# Patient Record
Sex: FEMALE | Race: BLACK OR AFRICAN AMERICAN | Employment: FULL TIME | URBAN - NONMETROPOLITAN AREA
[De-identification: names, ages, dates, MRNs, and addresses within clinical notes are randomized per-mention and may not be internally consistent; named-entity substitution may affect disease eponyms.]

---

## 2022-01-20 ENCOUNTER — APPOINTMENT (OUTPATIENT)
Dept: GENERAL RADIOLOGY | Age: 67
DRG: 389 | End: 2022-01-20
Attending: INTERNAL MEDICINE
Payer: COMMERCIAL

## 2022-01-20 ENCOUNTER — APPOINTMENT (OUTPATIENT)
Dept: CT IMAGING | Age: 67
DRG: 389 | End: 2022-01-20
Attending: INTERNAL MEDICINE
Payer: COMMERCIAL

## 2022-01-20 ENCOUNTER — HOSPITAL ENCOUNTER (INPATIENT)
Age: 67
LOS: 10 days | Discharge: HOME | DRG: 389 | End: 2022-01-30
Attending: INTERNAL MEDICINE | Admitting: INTERNAL MEDICINE
Payer: COMMERCIAL

## 2022-01-20 DIAGNOSIS — R94.31 ABNORMAL EKG: ICD-10-CM

## 2022-01-20 DIAGNOSIS — K56.609 SMALL BOWEL OBSTRUCTION (HCC): Primary | ICD-10-CM

## 2022-01-20 PROBLEM — R00.1 BRADYCARDIA: Status: ACTIVE | Noted: 2022-01-20

## 2022-01-20 LAB
ALBUMIN SERPL-MCNC: 3.9 G/DL (ref 3.5–4.7)
ALBUMIN/GLOB SERPL: 1.1 {RATIO}
ALP SERPL-CCNC: 128 U/L (ref 38–126)
ALT SERPL-CCNC: 15 U/L (ref 3–52)
ANION GAP SERPL CALC-SCNC: 15 MMOL/L
AST SERPL W P-5'-P-CCNC: 16 U/L (ref 14–74)
ATRIAL RATE: 48 BPM
ATRIAL RATE: 50 BPM
BASOPHILS # BLD: 0 K/UL (ref 0–0.1)
BASOPHILS NFR BLD: 0 % (ref 0–2)
BILIRUB DIRECT SERPL-MCNC: 0.1 MG/DL (ref 0–0.3)
BILIRUB SERPL-MCNC: 1 MG/DL (ref 0.2–1)
BUN SERPL-MCNC: 14 MG/DL (ref 9–21)
BUN/CREAT SERPL: 18
CA-I BLD-MCNC: 9.5 MG/DL (ref 8.5–10.5)
CALCULATED P AXIS, ECG09: -30 DEGREES
CALCULATED P AXIS, ECG09: 23 DEGREES
CALCULATED R AXIS, ECG10: -69 DEGREES
CALCULATED R AXIS, ECG10: -73 DEGREES
CALCULATED T AXIS, ECG11: -44 DEGREES
CALCULATED T AXIS, ECG11: -50 DEGREES
CHLORIDE SERPL-SCNC: 97 MMOL/L (ref 94–111)
CO2 SERPL-SCNC: 24 MMOL/L (ref 21–33)
CREAT SERPL-MCNC: 0.8 MG/DL (ref 0.7–1.2)
DIAGNOSIS, 93000: NORMAL
DIAGNOSIS, 93000: NORMAL
DIFFERENTIAL METHOD BLD: ABNORMAL
EOSINOPHIL # BLD: 0 K/UL (ref 0–0.4)
EOSINOPHIL NFR BLD: 0 % (ref 0–5)
ERYTHROCYTE [DISTWIDTH] IN BLOOD BY AUTOMATED COUNT: 14.1 % (ref 11.6–14.5)
GLOBULIN SER CALC-MCNC: 3.7 G/DL
GLUCOSE SERPL-MCNC: 81 MG/DL (ref 70–110)
HCT VFR BLD AUTO: 43 % (ref 35–45)
HGB BLD-MCNC: 14.3 G/DL (ref 12–16)
IMM GRANULOCYTES # BLD AUTO: 0 K/UL (ref 0–0.04)
IMM GRANULOCYTES NFR BLD AUTO: 0 % (ref 0–0.5)
LACTATE SERPL-SCNC: 2.2 MMOL/L (ref 0.5–2)
LIPASE SERPL-CCNC: 16 U/L (ref 10–57)
LYMPHOCYTES # BLD: 1.9 K/UL (ref 0.9–3.6)
LYMPHOCYTES NFR BLD: 24 % (ref 21–52)
MAGNESIUM SERPL-MCNC: 1.8 MG/DL (ref 1.7–2.8)
MCH RBC QN AUTO: 26 PG (ref 24–34)
MCHC RBC AUTO-ENTMCNC: 33.3 G/DL (ref 31–37)
MCV RBC AUTO: 78 FL (ref 78–100)
MONOCYTES # BLD: 0.5 K/UL (ref 0.05–1.2)
MONOCYTES NFR BLD: 7 % (ref 3–10)
NEUTS SEG # BLD: 5.5 K/UL (ref 1.8–8)
NEUTS SEG NFR BLD: 69 % (ref 40–73)
NRBC # BLD: 0 K/UL (ref 0–0.01)
NRBC BLD-RTO: 0 PER 100 WBC
P-R INTERVAL, ECG05: 125 MS
P-R INTERVAL, ECG05: 138 MS
PLATELET # BLD AUTO: 252 K/UL (ref 135–420)
PMV BLD AUTO: 11.2 FL (ref 9.2–11.8)
POTASSIUM SERPL-SCNC: 3 MMOL/L (ref 3.2–5.1)
PROT SERPL-MCNC: 7.6 G/DL (ref 6.1–8.4)
Q-T INTERVAL, ECG07: 490 MS
Q-T INTERVAL, ECG07: 499 MS
QRS DURATION, ECG06: 145 MS
QRS DURATION, ECG06: 148 MS
QTC CALCULATION (BEZET), ECG08: 438 MS
QTC CALCULATION (BEZET), ECG08: 451 MS
RBC # BLD AUTO: 5.51 M/UL (ref 4.2–5.3)
SODIUM SERPL-SCNC: 136 MMOL/L (ref 135–145)
TROPONIN I SERPL-MCNC: 0.02 NG/ML (ref 0.02–0.05)
TROPONIN I SERPL-MCNC: 0.02 NG/ML (ref 0.02–0.05)
VENTRICULAR RATE, ECG03: 48 BPM
VENTRICULAR RATE, ECG03: 49 BPM
WBC # BLD AUTO: 8.1 K/UL (ref 4.6–13.2)

## 2022-01-20 PROCEDURE — 65270000029 HC RM PRIVATE

## 2022-01-20 PROCEDURE — 74011000250 HC RX REV CODE- 250: Performed by: INTERNAL MEDICINE

## 2022-01-20 PROCEDURE — 74011250636 HC RX REV CODE- 250/636: Performed by: NURSE PRACTITIONER

## 2022-01-20 PROCEDURE — 83735 ASSAY OF MAGNESIUM: CPT

## 2022-01-20 PROCEDURE — 80076 HEPATIC FUNCTION PANEL: CPT

## 2022-01-20 PROCEDURE — 84484 ASSAY OF TROPONIN QUANT: CPT

## 2022-01-20 PROCEDURE — 74011000250 HC RX REV CODE- 250: Performed by: PHYSICIAN ASSISTANT

## 2022-01-20 PROCEDURE — 99284 EMERGENCY DEPT VISIT MOD MDM: CPT

## 2022-01-20 PROCEDURE — 71045 X-RAY EXAM CHEST 1 VIEW: CPT

## 2022-01-20 PROCEDURE — 80048 BASIC METABOLIC PNL TOTAL CA: CPT

## 2022-01-20 PROCEDURE — 74176 CT ABD & PELVIS W/O CONTRAST: CPT

## 2022-01-20 PROCEDURE — 74011250636 HC RX REV CODE- 250/636: Performed by: INTERNAL MEDICINE

## 2022-01-20 PROCEDURE — 96374 THER/PROPH/DIAG INJ IV PUSH: CPT

## 2022-01-20 PROCEDURE — 96375 TX/PRO/DX INJ NEW DRUG ADDON: CPT

## 2022-01-20 PROCEDURE — 83605 ASSAY OF LACTIC ACID: CPT

## 2022-01-20 PROCEDURE — 74011250636 HC RX REV CODE- 250/636: Performed by: PHYSICIAN ASSISTANT

## 2022-01-20 PROCEDURE — 99231 SBSQ HOSP IP/OBS SF/LOW 25: CPT | Performed by: SURGERY

## 2022-01-20 PROCEDURE — 83690 ASSAY OF LIPASE: CPT

## 2022-01-20 PROCEDURE — 85025 COMPLETE CBC W/AUTO DIFF WBC: CPT

## 2022-01-20 PROCEDURE — C9113 INJ PANTOPRAZOLE SODIUM, VIA: HCPCS | Performed by: INTERNAL MEDICINE

## 2022-01-20 PROCEDURE — 74019 RADEX ABDOMEN 2 VIEWS: CPT

## 2022-01-20 PROCEDURE — 93005 ELECTROCARDIOGRAM TRACING: CPT

## 2022-01-20 RX ORDER — POTASSIUM CHLORIDE 7.45 MG/ML
10 INJECTION INTRAVENOUS
Status: COMPLETED | OUTPATIENT
Start: 2022-01-20 | End: 2022-01-20

## 2022-01-20 RX ORDER — ATORVASTATIN CALCIUM 40 MG/1
40 TABLET, FILM COATED ORAL
COMMUNITY

## 2022-01-20 RX ORDER — POTASSIUM CHLORIDE 7.45 MG/ML
10 INJECTION INTRAVENOUS
Status: DISCONTINUED | OUTPATIENT
Start: 2022-01-20 | End: 2022-01-20

## 2022-01-20 RX ORDER — LANOLIN ALCOHOL/MO/W.PET/CERES
400 CREAM (GRAM) TOPICAL DAILY
COMMUNITY

## 2022-01-20 RX ORDER — CARVEDILOL 6.25 MG/1
6.25 TABLET ORAL 2 TIMES DAILY WITH MEALS
COMMUNITY
Start: 2021-10-19

## 2022-01-20 RX ORDER — MAGNESIUM SULFATE HEPTAHYDRATE 40 MG/ML
2 INJECTION, SOLUTION INTRAVENOUS ONCE
Status: COMPLETED | OUTPATIENT
Start: 2022-01-20 | End: 2022-01-20

## 2022-01-20 RX ORDER — GUAIFENESIN 100 MG/5ML
LIQUID (ML) ORAL
COMMUNITY

## 2022-01-20 RX ORDER — ONDANSETRON 2 MG/ML
4 INJECTION INTRAMUSCULAR; INTRAVENOUS
Status: COMPLETED | OUTPATIENT
Start: 2022-01-20 | End: 2022-01-20

## 2022-01-20 RX ORDER — LORAZEPAM 2 MG/ML
1 INJECTION INTRAMUSCULAR
Status: DISCONTINUED | OUTPATIENT
Start: 2022-01-20 | End: 2022-01-30 | Stop reason: HOSPADM

## 2022-01-20 RX ORDER — NITROGLYCERIN 0.4 MG/1
TABLET SUBLINGUAL
COMMUNITY
Start: 2021-10-20

## 2022-01-20 RX ORDER — LYSINE HCL 500 MG
1 TABLET ORAL DAILY
COMMUNITY

## 2022-01-20 RX ORDER — DEXTROSE, SODIUM CHLORIDE, AND POTASSIUM CHLORIDE 5; .45; .15 G/100ML; G/100ML; G/100ML
100 INJECTION INTRAVENOUS CONTINUOUS
Status: DISCONTINUED | OUTPATIENT
Start: 2022-01-20 | End: 2022-01-30 | Stop reason: HOSPADM

## 2022-01-20 RX ADMIN — FAMOTIDINE 20 MG: 10 INJECTION, SOLUTION INTRAVENOUS at 22:14

## 2022-01-20 RX ADMIN — MAGNESIUM SULFATE HEPTAHYDRATE 2 G: 40 INJECTION, SOLUTION INTRAVENOUS at 14:00

## 2022-01-20 RX ADMIN — POTASSIUM CHLORIDE 10 MEQ: 7.46 INJECTION, SOLUTION INTRAVENOUS at 18:14

## 2022-01-20 RX ADMIN — SODIUM CHLORIDE 80 MG: 9 INJECTION INTRAMUSCULAR; INTRAVENOUS; SUBCUTANEOUS at 10:18

## 2022-01-20 RX ADMIN — LORAZEPAM 1 MG: 2 INJECTION INTRAMUSCULAR; INTRAVENOUS at 22:14

## 2022-01-20 RX ADMIN — POTASSIUM CHLORIDE 10 MEQ: 7.46 INJECTION, SOLUTION INTRAVENOUS at 18:48

## 2022-01-20 RX ADMIN — POTASSIUM CHLORIDE 10 MEQ: 7.46 INJECTION, SOLUTION INTRAVENOUS at 19:58

## 2022-01-20 RX ADMIN — POTASSIUM CHLORIDE 10 MEQ: 7.46 INJECTION, SOLUTION INTRAVENOUS at 17:20

## 2022-01-20 RX ADMIN — POTASSIUM CHLORIDE, DEXTROSE MONOHYDRATE AND SODIUM CHLORIDE 100 ML/HR: 150; 5; 450 INJECTION, SOLUTION INTRAVENOUS at 12:54

## 2022-01-20 RX ADMIN — ONDANSETRON 4 MG: 2 INJECTION INTRAMUSCULAR; INTRAVENOUS at 10:16

## 2022-01-20 NOTE — PROGRESS NOTES
1330- assumed care of patient to room 247. NG  Intact and on continuous suction per surgery. No bowel sounds noted to lower quadrants. Pt reports being uncomfortable. Oriented to room and cb system. CBWR    1500- in on rounds, no needs expressed    1730- pt up to [facility] and NG tube came out completely. 1810- new NG inserted into right nare, pt reports being in pain and nauseas. PA aware- he contacted surgery who ordered to turn ng to int medium suction- complete.  Ativan will be ordered

## 2022-01-20 NOTE — PROGRESS NOTES
Problem: Falls - Risk of  Goal: *Absence of Falls  Description: Document Loida Skill Fall Risk and appropriate interventions in the flowsheet.   Outcome: Progressing Towards Goal  Note: Fall Risk Interventions:                                Problem: Patient Education: Go to Patient Education Activity  Goal: Patient/Family Education  Outcome: Progressing Towards Goal

## 2022-01-20 NOTE — ROUTINE PROCESS
TRANSFER - OUT REPORT:    Verbal report given to Jacqueline Islas RN(name) on Fadia Vaughn  being transferred to [facility] (unit) for routine progression of care       Report consisted of patients Situation, Background, Assessment and   Recommendations(SBAR). Information from the following report(s) SBAR, MAR, Recent Results and Cardiac Rhythm Sinus Lesli Villar was reviewed with the receiving nurse. Lines:   Peripheral IV 01/20/22 Right Antecubital (Active)   Site Assessment Clean, dry, & intact 01/20/22 0901   Phlebitis Assessment 0 01/20/22 0901   Infiltration Assessment 0 01/20/22 0901   Dressing Status Clean, dry, & intact 01/20/22 0901   Hub Color/Line Status Green 01/20/22 0901        Opportunity for questions and clarification was provided.       Patient transported with:   Monitor  Registered Nurse

## 2022-01-20 NOTE — CONSULTS
Surgery consult      CC:  Nausea and vomiting. HPI:  The patient is a 72 y/o female with a 4 day history of nausea and vomiting. She has taken nothing other than water by mouth during that time. She states that when she drinks water, she immediately vomits it back up. Denies any hematemesis. She denies passage of flatus or stool during that time also. She presents to the E.R. for evaluation. History and CT scan done there suggest small bowel obstruction and surgery consultation obtained. Past Medical and surgcial history:    History of CAD. M.I. X2 with multiple cardiac stents. Denies any history of CHF  S/P vaginal histerectomy. Hx of knee surgery. Denies any history of PUD, Gall bladder disease, diverticular diseasefor cancer . She had a colonoscopy 2 years ago and she reports a history of benign colon polyps. Social history:    Currently a smoker and uses smokeless tobacco.    Medication allergies:  PCN    Medications reviewed    Family history; Noncontributory    Review of systems:  Negative except for what was described in the H.P.I. She denies any chest pain, orthopnea or D.O.E. Physical exam: VS noted. 72 y/o female in no acute distress. Does not appear acutely ill. Cooperative to interview and exam.  HEENT:--oral mucosa is dry    Neck:--Supple without masses or thyroid enlargement    Chest:  Lungs clear. Heart:  Regular in rhythm and rate  Abdomen is soft, not distended, small reducible umbilical hernia. No abdominal incisions noted, no masses. B.S. are normoactive. No groin hernias. Rectal-scant brown stool weakly guiac positive. Pelvic: deferred  Ext.;  No C/C/E. Neurologic grossly intact. Lab:  WBC 8000  K+ 3.0    CT films and report reviewed:  Mild dilitation of proximal small bowel. No free fluid. No free air. Question of closed loop raised because of distal decompression andproximal distention    Impression: 1. At least a partial S.B.O.   Patient in no distress and abdominal exam is remarkably benign. 2. Hypokalemia--may be contributing to # 1                       3. C. A.D by history  Plan:    Due to patient's benign exam, I fel a trial of non operative therapy with NG decompression, replacement of electrolytes and serial X rays and exams are warranted. Have discussed with E.R. and admitting physicians.

## 2022-01-20 NOTE — H&P
Hospitalist Admission Note    NAME: Marty Millard   :  1955   MRN:  [id number]     Date/Time:  2022 12:14 PM    Attending: Edmund Wright MD  Patient PCP: None  ______________________________________________________________________  Given the patient's current clinical presentation, I have a high level of concern for decompensation if discharged from the emergency department. Complex decision making was performed, which includes reviewing the patient's available past medical records, lab, and x-rays    Assessment / Plan:  Patient Active Problem List    Diagnosis Date Noted    Small bowel obstruction ([address] 2022    Abnormal EKG 2022        Abnormal EKG  -Rhythm: sinus bradycardia; and regular . Rate (approx.): 48; Axis: left axis deviation; NM interval: normal; QRS interval: prolonged; ST/T wave: T wave inverted; Other findings: QTc 438. Deeply inverted T waves in II; III; aVF. No ST elevation noted in I or aVL. .  -Evaluated by cardiology  -Cardiology consultation deferred  -We will maintain potassium and magnesium at 4 and 2 respectively    Bradycardia  -Evaluated by cardiology  -Pulse ranging between 48 and 59  -Blood pressure mildly elevated on admission  - Telemetry  -Monitor      Small bowel obstruction (HCC)  - Evaluated by general surgery  -NG to suction and n.p.o.  -Repeat abdominal study in the morning  -Will be signed out to oncoming general surgery for tomorrow  -Requiring no pain medications at this time        Code Status: Full code      DVT Prophylaxis: Lovenox SubQ    GI Prophylaxis: Pepcid twice daily      Subjective:   CHIEF COMPLAINT: Midepigastric pain    HISTORY OF PRESENT ILLNESS:     Rossy Kelly is a 73 y.o. female who presents with past medical history for MI, hyperlipidemia, hypertension who presented to the emergency department with epigastric abdominal pain associate with nausea and vomiting for 5 days.   She initially attributed it to seafood she ate on Sunday and then the discomfort persisted burning in nature radiating to her back. She subsequently had related shortness of breath and diaphoresis. She was evaluated in the emergency department and found to have a partial small bowel obstruction who was evaluated by the surgeon with it characterized as mild or even benign. Was also found to have a low potassium she feels could have been contributing to the partial SBO. Patient has been admitted for evaluation and treatment of the problems noted in the plan above. Past Medical History:   Diagnosis Date    Heart attack ([address]     x 2, stents placed x 5        Past Surgical History:   Procedure Laterality Date    HX HYSTERECTOMY      HX ORTHOPAEDIC      right knee       Social History     Tobacco Use    Smoking status: Current Every Day Smoker    Smokeless tobacco: Current User   Substance Use Topics    Alcohol use: Not on file     Comment: socially        No family history on file. Allergies   Allergen Reactions    Penicillins Hives        Prior to Admission medications    Medication Sig Start Date End Date Taking? Authorizing Provider   carvediloL (COREG) 6.25 mg tablet 1 tablet with food 10/19/21  Yes Yolanda St MD   atorvastatin (LIPITOR) 40 mg tablet atorvastatin 40 mg tablet    Yolanda St MD   aspirin (Wendy Chewable Aspirin) 81 mg chewable tablet aspirin 81 mg tablet   Take 1 tablet every day by oral route. Yolanda St MD   nitroglycerin (NITROSTAT) 0.4 mg SL tablet place 1 tablet under the tongue if needed every 5 minutes for jonnie...  (REFER TO PRESCRIPTION NOTES). 10/20/21   Yolanda St MD   folic acid 738 mcg tablet Take 400 mcg by mouth daily. Yolanda St MD   Calcium Carbonate-Vit D3-Min 600 mg calcium- 400 unit tab Take 1 Tablet by mouth daily.     Yolanda St MD       REVIEW OF SYSTEMS:   Total of 12 systems reviewed as follows:         POSITIVE= underlined text  Negative = text not underlined  General:  fever, chills, sweats, generalized weakness, weight loss/gain,      loss of appetite   Eyes:    blurred vision, eye pain, loss of vision, double vision  ENT:    rhinorrhea, pharyngitis   Respiratory:   cough, sputum production, SOB, FONTENOT, wheezing, pleuritic pain   Cardiology:   chest pain, palpitations, orthopnea, PND, edema, syncope   Gastrointestinal:  abdominal pain , N/V, diarrhea, dysphagia, constipation, bleeding   Genitourinary:  frequency, urgency, dysuria, hematuria, incontinence   Muskuloskeletal :  arthralgia, myalgia, back pain  Hematology:  easy bruising, nose or gum bleeding, lymphadenopathy   Dermatological: rash, ulceration, pruritis, color change / jaundice  Endocrine:   hot flashes or polydipsia   Neurological:  headache, dizziness, confusion, focal weakness, paresthesia,     Speech difficulties, memory loss, gait difficulty  Psychological: Feelings of anxiety, depression, agitation    Objective:   VITALS:    Visit Vitals  BP (!) 166/83 (BP 1 Location: Left upper arm, BP Patient Position: Semi fowlers)   Pulse (!) 51   Temp 97.6 °F (36.4 °C)   Resp 18   Ht 5' 2\" (1.575 m)   Wt 71.7 kg (158 lb)   SpO2 100%   BMI 28.90 kg/m²       PHYSICAL EXAM:    General:    Alert, cooperative, no distress, appears stated age. HEENT: Atraumatic, anicteric sclerae, pink conjunctivae     No oral ulcers, mucosa moist, throat clear, dentition fair  Neck:  Supple, symmetrical,  thyroid: non tender  Lungs:   Clear to auscultation bilaterally. No Wheezing or Rhonchi. No rales. Chest wall:  No tenderness  No Accessory muscle use. Heart:   Regular  rhythm,  No  murmur   No edema  Abdomen:   Soft, non-tender. Not distended. Bowel sounds normal  Extremities: No cyanosis. No clubbing,      Skin turgor normal, Capillary refill normal, Radial dial pulse 2+  Skin:     Not pale. Not Jaundiced  No rashes   Psych:  Good insight. Not depressed. Not anxious or agitated. Neurologic: EOMs intact.  No facial asymmetry. No aphasia or slurred speech. Symmetrical strength, Sensation grossly intact. Alert and oriented X 4.     _______________________________________________________________________  Care Plan discussed with:    Comments   Patient X    Family      RN X    Care Manager                    Consultant:      _______________________________________________________________________  Expected  Disposition:   Home with Family X   HH/PT/OT/RN    SNF/LTC    MAXIMUS    ________________________________________________________________________  TOTAL TIME:  48  Minutes    Critical Care Provided     Minutes non procedure based      Comments    X Reviewed previous records   >50% of visit spent in counseling and coordination of care X Discussion with patient and/or family and questions answered       ________________________________________________________________________  Signed: Aurelio Degroot, PhD, PA-C, [facility]    Procedures: see electronic medical records for all procedures/Xrays and details which were not copied into this note but were reviewed prior to creation of Plan.     LAB DATA REVIEWED:    Recent Results (from the past 24 hour(s))   EKG, 12 LEAD, INITIAL    Collection Time: 01/20/22  8:50 AM   Result Value Ref Range    Ventricular Rate 48 BPM    Atrial Rate 48 BPM    P-R Interval 125 ms    QRS Duration 148 ms    Q-T Interval 490 ms    QTC Calculation (Bezet) 438 ms    Calculated P Axis -30 degrees    Calculated R Axis -73 degrees    Calculated T Axis -44 degrees    Diagnosis       Sinus bradycardia  RBBB and LAFB  Probable left ventricular hypertrophy  Abnormal T, consider ischemia, inferior leads     CBC WITH AUTOMATED DIFF    Collection Time: 01/20/22  8:55 AM   Result Value Ref Range    WBC 8.1 4.6 - 13.2 K/uL    RBC 5.51 (H) 4.20 - 5.30 M/uL    HGB 14.3 12.0 - 16.0 g/dL    HCT 43.0 35.0 - 45.0 %    MCV 78.0 78.0 - 100.0 FL    MCH 26.0 24.0 - 34.0 PG    MCHC 33.3 31.0 - 37.0 g/dL    RDW 14.1 11.6 - 14.5 %    PLATELET 222 290 - 653 K/uL    MPV 11.2 9.2 - 11.8 FL    NRBC 0.0 0.0  WBC    ABSOLUTE NRBC 0.00 0.00 - 0.01 K/uL    NEUTROPHILS 69 40 - 73 %    LYMPHOCYTES 24 21 - 52 %    MONOCYTES 7 3 - 10 %    EOSINOPHILS 0 0 - 5 %    BASOPHILS 0 0 - 2 %    IMMATURE GRANULOCYTES 0 0 - 0.5 %    ABS. NEUTROPHILS 5.5 1.8 - 8.0 K/UL    ABS. LYMPHOCYTES 1.9 0.9 - 3.6 K/UL    ABS. MONOCYTES 0.5 0.05 - 1.2 K/UL    ABS. EOSINOPHILS 0.0 0.0 - 0.4 K/UL    ABS. BASOPHILS 0.0 0.0 - 0.1 K/UL    ABS. IMM. GRANS. 0.0 0.00 - 0.04 K/UL    DF AUTOMATED     METABOLIC PANEL, BASIC    Collection Time: 01/20/22  8:55 AM   Result Value Ref Range    Sodium 136 135 - 145 mmol/L    Potassium 3.0 (L) 3.2 - 5.1 mmol/L    Chloride 97 94 - 111 mmol/L    CO2 24 21 - 33 mmol/L    Anion gap 15 mmol/L    Glucose 81 70 - 110 mg/dL    BUN 14 9 - 21 mg/dL    Creatinine 0.80 0.70 - 1.20 mg/dL    BUN/Creatinine ratio 18      GFR est AA >60 ml/min/1.73m2    GFR est non-AA >60 ml/min/1.73m2    Calcium 9.5 8.5 - 10.5 mg/dL   TROPONIN I    Collection Time: 01/20/22  8:55 AM   Result Value Ref Range    Troponin-I, Qt. 0.02 0.02 - 0.05 ng/mL   LIPASE    Collection Time: 01/20/22  8:55 AM   Result Value Ref Range    Lipase 16 10 - 57 U/L   LACTIC ACID    Collection Time: 01/20/22  8:55 AM   Result Value Ref Range    Lactic acid 2.2 (HH) 0.5 - 2.0 mmol/L   MAGNESIUM    Collection Time: 01/20/22  8:55 AM   Result Value Ref Range    Magnesium 1.8 1.7 - 2.8 mg/dL   HEPATIC FUNCTION PANEL    Collection Time: 01/20/22  8:55 AM   Result Value Ref Range    Protein, total 7.6 6.1 - 8.4 g/dL    Albumin 3.9 3.5 - 4.7 g/dL    Globulin 3.7 g/dL    A-G Ratio 1.1      Bilirubin, total 1.0 0.2 - 1.0 mg/dL    Bilirubin, direct 0.1 0.0 - 0.3 mg/dL    Alk.  phosphatase 128 (H) 38 - 126 U/L    AST (SGOT) 16 14 - 74 U/L    ALT (SGPT) 15 3 - 52 U/L   EKG, 12 LEAD, INITIAL    Collection Time: 01/20/22 10:23 AM   Result Value Ref Range    Ventricular Rate 49 BPM    Atrial Rate 50 BPM    P-R Interval 138 ms    QRS Duration 145 ms    Q-T Interval 499 ms    QTC Calculation (Bezet) 451 ms    Calculated P Axis 23 degrees    Calculated R Axis -69 degrees    Calculated T Axis -50 degrees    Diagnosis       Sinus bradycardia  RBBB and LAFB  Probable left ventricular hypertrophy

## 2022-01-20 NOTE — ASSESSMENT & PLAN NOTE
-Evaluated by cardiology  -Pulse ranging between 48 and 59  -Blood pressure mildly elevated on admission  - Telemetry  -Monitor

## 2022-01-20 NOTE — ED TRIAGE NOTES
Patient recently dx with food poison this week, c/o nausea and vomited x 3, sharp abdominal pains. Took nausea this morning. Denies diarrhea. fever at home Sunday and Monday 102.  Has had covid vaccines

## 2022-01-20 NOTE — ASSESSMENT & PLAN NOTE
- Evaluated by general surgery  -NG to suction and n.p.o.  -Repeat abdominal study in the morning  -Will be signed out to oncoming general surgery for tomorrow  -Requiring no pain medications at this time

## 2022-01-20 NOTE — ED PROVIDER NOTES
EMERGENCY DEPARTMENT HISTORY AND PHYSICAL EXAM      Date: 1/20/2022  Patient Name: Dwayne Rawls      History of Presenting Illness     Chief Complaint   Patient presents with    Vomiting       History Provided By: Patient    HPI: Dwayne Rawls, 73 y.o. female with a past medical history significant for MI x 2; 3 stents placed in [facility] in '04 and one in [facility] in 6/20; Dr Yuan Pollock; HTN; HLD that presents to the ED with cc of epigastric abd pain; n/v for past 5 days. Pt states that she ate some seafood on Sunday and later developed n/v and burning epigastric discomfort that radiates straight back and is associated with waves of n/v. Pain worse with water. No diarrhea. No sob or diaphoresis. There are no other complaints, changes, or physical findings at this time. PCP: None    Current Outpatient Medications   Medication Sig Dispense Refill    carvediloL (COREG) 6.25 mg tablet 1 tablet with food      atorvastatin (LIPITOR) 40 mg tablet atorvastatin 40 mg tablet      aspirin (Wendy Chewable Aspirin) 81 mg chewable tablet aspirin 81 mg tablet   Take 1 tablet every day by oral route.  nitroglycerin (NITROSTAT) 0.4 mg SL tablet place 1 tablet under the tongue if needed every 5 minutes for jonnie...  (REFER TO PRESCRIPTION NOTES).  folic acid 869 mcg tablet Take 400 mcg by mouth daily.  Calcium Carbonate-Vit D3-Min 600 mg calcium- 400 unit tab Take 1 Tablet by mouth daily. Past History     Past Medical History:  Past Medical History:   Diagnosis Date    Heart attack ([address]     x 2, stents placed x 5       Past Surgical History:  Past Surgical History:   Procedure Laterality Date    HX HYSTERECTOMY      HX ORTHOPAEDIC      right knee       Family History:  No family history on file.     Social History:  Social History     Tobacco Use    Smoking status: Current Every Day Smoker    Smokeless tobacco: Current User   Vaping Use    Vaping Use: Not on file   Substance Use Topics    Alcohol use: Not on file     Comment: socially    Drug use: Not Currently       Allergies: Allergies   Allergen Reactions    Penicillins Hives         Review of Systems     Review of Systems   Constitutional: Negative for chills and fever. HENT: Negative for trouble swallowing. Eyes: Negative for visual disturbance. Respiratory: Negative for cough, chest tightness and shortness of breath. Cardiovascular: Negative for chest pain, palpitations and leg swelling. Gastrointestinal: Positive for abdominal pain, nausea and vomiting. Negative for constipation and diarrhea. Genitourinary: Negative for dysuria and flank pain. Musculoskeletal: Negative for back pain. Skin: Negative for rash. Neurological: Negative for headaches. Psychiatric/Behavioral: Negative for confusion. Physical Exam     Physical Exam  Vitals and nursing note reviewed. Constitutional:       General: She is not in acute distress. Appearance: She is well-developed. She is ill-appearing. HENT:      Head: Normocephalic. Mouth/Throat:      Pharynx: No oropharyngeal exudate. Eyes:      General:         Right eye: No discharge. Pupils: Pupils are equal, round, and reactive to light. Neck:      Thyroid: No thyromegaly. Vascular: No JVD. Cardiovascular:      Rate and Rhythm: Normal rate and regular rhythm. Heart sounds: No murmur heard. No friction rub. No gallop. Pulmonary:      Effort: Pulmonary effort is normal. No respiratory distress. Breath sounds: Normal breath sounds. No wheezing or rales. Chest:      Chest wall: No tenderness. Abdominal:      General: Bowel sounds are normal. There is no distension. Palpations: Abdomen is soft. Tenderness: There is abdominal tenderness. There is no guarding or rebound.       Comments: Mild epigastric pain w/o guarding or rebound   Genitourinary:     Comments: Brown; trace hemoccult positive stool  Musculoskeletal:         General: Normal range of motion. Cervical back: Normal range of motion. Skin:     General: Skin is warm. Findings: No erythema. Neurological:      Mental Status: She is alert and oriented to person, place, and time. Psychiatric:         Behavior: Behavior normal.         Lab and Diagnostic Study Results     Labs -     Recent Results (from the past 12 hour(s))   EKG, 12 LEAD, INITIAL    Collection Time: 01/20/22  8:50 AM   Result Value Ref Range    Ventricular Rate 48 BPM    Atrial Rate 48 BPM    P-R Interval 125 ms    QRS Duration 148 ms    Q-T Interval 490 ms    QTC Calculation (Bezet) 438 ms    Calculated P Axis -30 degrees    Calculated R Axis -73 degrees    Calculated T Axis -44 degrees    Diagnosis       Sinus bradycardia  RBBB and LAFB  Probable left ventricular hypertrophy  Abnormal T, consider ischemia, inferior leads     CBC WITH AUTOMATED DIFF    Collection Time: 01/20/22  8:55 AM   Result Value Ref Range    WBC 8.1 4.6 - 13.2 K/uL    RBC 5.51 (H) 4.20 - 5.30 M/uL    HGB 14.3 12.0 - 16.0 g/dL    HCT 43.0 35.0 - 45.0 %    MCV 78.0 78.0 - 100.0 FL    MCH 26.0 24.0 - 34.0 PG    MCHC 33.3 31.0 - 37.0 g/dL    RDW 14.1 11.6 - 14.5 %    PLATELET 786 689 - 176 K/uL    MPV 11.2 9.2 - 11.8 FL    NRBC 0.0 0.0  WBC    ABSOLUTE NRBC 0.00 0.00 - 0.01 K/uL    NEUTROPHILS 69 40 - 73 %    LYMPHOCYTES 24 21 - 52 %    MONOCYTES 7 3 - 10 %    EOSINOPHILS 0 0 - 5 %    BASOPHILS 0 0 - 2 %    IMMATURE GRANULOCYTES 0 0 - 0.5 %    ABS. NEUTROPHILS 5.5 1.8 - 8.0 K/UL    ABS. LYMPHOCYTES 1.9 0.9 - 3.6 K/UL    ABS. MONOCYTES 0.5 0.05 - 1.2 K/UL    ABS. EOSINOPHILS 0.0 0.0 - 0.4 K/UL    ABS. BASOPHILS 0.0 0.0 - 0.1 K/UL    ABS. IMM.  GRANS. 0.0 0.00 - 0.04 K/UL    DF AUTOMATED     METABOLIC PANEL, BASIC    Collection Time: 01/20/22  8:55 AM   Result Value Ref Range    Sodium 136 135 - 145 mmol/L    Potassium 3.0 (L) 3.2 - 5.1 mmol/L    Chloride 97 94 - 111 mmol/L    CO2 24 21 - 33 mmol/L    Anion gap 15 mmol/L    Glucose 81 70 - 110 mg/dL    BUN 14 9 - 21 mg/dL    Creatinine 0.80 0.70 - 1.20 mg/dL    BUN/Creatinine ratio 18      GFR est AA >60 ml/min/1.73m2    GFR est non-AA >60 ml/min/1.73m2    Calcium 9.5 8.5 - 10.5 mg/dL   TROPONIN I    Collection Time: 01/20/22  8:55 AM   Result Value Ref Range    Troponin-I, Qt. 0.02 0.02 - 0.05 ng/mL   LIPASE    Collection Time: 01/20/22  8:55 AM   Result Value Ref Range    Lipase 16 10 - 57 U/L   LACTIC ACID    Collection Time: 01/20/22  8:55 AM   Result Value Ref Range    Lactic acid 2.2 (HH) 0.5 - 2.0 mmol/L   MAGNESIUM    Collection Time: 01/20/22  8:55 AM   Result Value Ref Range    Magnesium 1.8 1.7 - 2.8 mg/dL   HEPATIC FUNCTION PANEL    Collection Time: 01/20/22  8:55 AM   Result Value Ref Range    Protein, total 7.6 6.1 - 8.4 g/dL    Albumin 3.9 3.5 - 4.7 g/dL    Globulin 3.7 g/dL    A-G Ratio 1.1      Bilirubin, total 1.0 0.2 - 1.0 mg/dL    Bilirubin, direct 0.1 0.0 - 0.3 mg/dL    Alk. phosphatase 128 (H) 38 - 126 U/L    AST (SGOT) 16 14 - 74 U/L    ALT (SGPT) 15 3 - 52 U/L   EKG, 12 LEAD, INITIAL    Collection Time: 01/20/22 10:23 AM   Result Value Ref Range    Ventricular Rate 49 BPM    Atrial Rate 50 BPM    P-R Interval 138 ms    QRS Duration 145 ms    Q-T Interval 499 ms    QTC Calculation (Bezet) 451 ms    Calculated P Axis 23 degrees    Calculated R Axis -69 degrees    Calculated T Axis -50 degrees    Diagnosis       Sinus bradycardia  RBBB and LAFB  Probable left ventricular hypertrophy         Radiologic Studies -   [unfilled]  CT Results  (Last 48 hours)               01/20/22 1002  CT ABD PELV WO CONT Final result    Impression:      1. Distal mild small bowel obstruction. Transition appears to be within the   anterior abdomen, likely from adhesions. Proximal small bowel also is   decompressed, raising the possibility of closed loop obstruction. 2. Cholelithiasis. 3. Widemouth fat containing umbilical hernia.        Narrative:  EXAM: CT of the abdomen and pelvis CLINICAL INDICATION/HISTORY: epigastric pain and vomiting     > Additional: None. COMPARISON: CT abdomen pelvis with contrast 02/06/2017     > Reference Exam: None. TECHNIQUE: Axial CT imaging of the abdomen and pelvis was performed without   intravenous contrast. Oral contrast not administered. Multiplanar reformats   were generated. Dose reduction techniques used: automated exposure control,   adjustment of the mAs and/or kVp according to patient size, and iterative   reconstruction techniques. Digital Imaging and Communications in Medicine   (DICOM) format image data are available to nonaffiliated external healthcare   facilities or entities on a secure, media free, reciprocally searchable basis   with patient authorization for at least a 12-month period after this study. _______________       FINDINGS:       LOWER CHEST: Coronary atherosclerosis. Lung bases are clear. LIVER, BILIARY: Liver is unremarkable. No biliary dilation. Dependent gallstone   without gallbladder distention or adjacent inflammatory changes. PANCREAS: Unremarkable. SPLEEN: Unremarkable. ADRENALS: Unremarkable. KIDNEYS/URETERS/BLADDER: No hydronephrosis. No calculi. Upper pole low density   lesion measuring 8 mm, very likely benign such as cyst. No follow-up imaging is   recommended as incidental lesions are likely benign. LYMPH NODES: No enlarged lymph nodes. GASTROINTESTINAL TRACT: Long segment of mild small bowel dilatation (up to 3.5   cm diameter) extending from the mid small bowel to the distal small bowel. There   is decompressed proximal and distal small bowel. Distal transition appears to be   within the anterior abdomen (axial image 86; sagittal 54), with no mass, likely   secondary to adhesions. No colonic dilatation. Colonic diverticulosis. PELVIC ORGANS: Hysterectomy. VASCULATURE: Mild to moderate aortoiliac atherosclerosis.        BONES: No acute or aggressive osseous abnormalities identified. Moderate lower   lumbar degenerative disc disease. OTHER: No ascites. Widemouth fat containing umbilical hernia with neck measuring   20 mm.       _______________               CXR Results  (Last 48 hours)               01/20/22 0937  XR CHEST PORT Final result    Impression:      No active cardiopulmonary disease. Narrative:  EXAM: CHEST RADIOGRAPH, SINGLE VIEW       CLINICAL INDICATION/HISTORY: Fever, chest pain, vomiting       COMPARISON: 6/15/2020       TECHNIQUE: Portable frontal view of the chest was obtained.        _______________       FINDINGS:       SUPPORT DEVICES: None. HEART AND MEDIASTINUM: Cardiomediastinal silhouette appears within normal   limits. Tortuous and atherosclerotic thoracic aorta. No central vascular   congestion. LUNGS AND PLEURAL SPACES: Lungs are well aerated with no confluent airspace   opacity. No pleural effusion or pneumothorax. BONY THORAX AND SOFT TISSUES: No acute osseous abnormality. _______________                 Medical Decision Making and ED Course   - I am the first and primary provider for this patient AND AM THE PRIMARY PROVIDER OF RECORD. - I reviewed the vital signs, available nursing notes, past medical history, past surgical history, family history and social history. - Initial assessment performed. The patients presenting problems have been discussed, and the staff are in agreement with the care plan formulated and outlined with them. I have encouraged them to ask questions as they arise throughout their visit. Vital Signs-Reviewed the patient's vital signs. Patient Vitals for the past 12 hrs:   Temp Pulse Resp BP SpO2   01/20/22 0901 -- -- -- -- 100 %   01/20/22 0856 97.6 °F (36.4 °C) (!) 51 18 (!) 166/83 100 %       EKG interpretation: (Preliminary): Performed at 0850  Rhythm: sinus bradycardia; and regular .  Rate (approx.): 48; Axis: left axis deviation; AR interval: normal; QRS interval: prolonged; ST/T wave: T wave inverted; Other findings: QTc 438. Deeply inverted T waves in II; III; aVF. No ST elevation noted in I or aVL. Nia Henao Records Reviewed: Nursing Notes    ED Course: Bowel obstruction; enteric infection; gallstones; pancreatitis; MI; labyrinthitis; malignancy; IC bleed or process; medication related; Uremia            Consultations:       Consultations:   9:25 AM Consulted Cardiology via [address]; JERZY Gaston; EKG transmitted. 11:56 AM  Seen in ER by surgeon; Dr Elizabeth Garcia; distal SBO; recommends NG tube and admit to Dr Blanco Easton whom he spoke to on the phone. Discussed with pt. Seen in ER by JERZY Gaston who was able to get an EKG from 10/19/21 which show the same inferior wall changes as today. Procedures and Critical Care         CRITICAL CARE NOTE :  9:14 AM  Amount of Critical Care Time: 30(minutes)    IMPENDING DETERIORATION -bowel obstruction  ASSOCIATED RISK FACTORS - abnormal EKG  MANAGEMENT- Bedside Assessment  INTERPRETATION -  CT Scan  INTERVENTIONS - gastric tube  CASE REVIEW - Medical Sub-Specialist  TREATMENT RESPONSE -Stable  PERFORMED BY - Self    NOTES   :  I have spent critical care time involved in lab review, consultations with specialist, family decision- making, bedside attention and documentation. This time excludes time spent in any separate billed procedures. During this entire length of time I was immediately available to the patient . Missy Kwan MD        Disposition     Disposition: Admit      Diagnosis     Clinical Impression:   1. Small bowel obstruction ([address]    2. Abnormal EKG        Attestations:    Missy Kwan MD    Please note that this dictation was completed with Proven, the computer voice recognition software. Quite often unanticipated grammatical, syntax, homophones, and other interpretive errors are inadvertently transcribed by the computer software. Please disregard these errors. Please excuse any errors that have escaped final proofreading. Thank you.

## 2022-01-20 NOTE — ASSESSMENT & PLAN NOTE
-Rhythm: sinus bradycardia; and regular . Rate (approx.): 48; Axis: left axis deviation; OH interval: normal; QRS interval: prolonged; ST/T wave: T wave inverted; Other findings: QTc 438. Deeply inverted T waves in II; III; aVF. No ST elevation noted in I or aVL.  .  -Evaluated by cardiology  -Cardiology consultation deferred  -We will maintain potassium and magnesium at 4 and 2 respectively

## 2022-01-20 NOTE — CONSULTS
[facility] CARDIOLOGY  CARDIOLOGY CONSULTATION    REASON FOR CONSULT: Abnormal EKG    REQUESTING PROVIDER: PAUL Morales MD    CHIEF COMPLAINT: Abdominal pain, nausea, and vomiting    HISTORY OF PRESENT ILLNESS:  Randee Rodríguez is a 73y.o. year-old female with past medical history significant for CAD, MI x 2, HTN, and hyperlipidemia who was seen today for evaluation of abnormal EKG. The patient presented to the [facility] ER today for complaints of abdominal pain, nausea, and vomiting. She states her symptoms started 4 days ago after eating seafood. She complains of abdominal pain primarily in the epigastric area with nausea and vomiting but no diarrhea. She states any PO intake worsens her symptoms. She denies chest pain or shortness of breath. She is a patient of [facility]. Consultation was requested for evaluation of abnormal EKG. Records from hospital admission course thus far reviewed. Telemetry reviewed. No events overnight. The patient is in sinus bradycardia. INPATIENT MEDICATIONS:  Home medications reviewed. Current Facility-Administered Medications:     potassium chloride 10 mEq in 100 ml IVPB, 10 mEq, IntraVENous, Q1H, MATT Jackson    dextrose 5% - 0.45% NaCl with KCl 20 mEq/L infusion, 100 mL/hr, IntraVENous, CONTINUOUS, MATT Jackson    magnesium sulfate 2 g/50 ml IVPB (premix or compounded), 2 g, IntraVENous, ONCE, Olive Dias NP     ALLERGIES:  Allergies reviewed with the patient,  Allergies   Allergen Reactions    Penicillins Hives    . FAMILY HISTORY:  Family history reviewed. SOCIAL HISTORY:  Notable for current (1/2 ppd) tobacco use, rare alcohol use, and no illicit drug use. REVIEW OF SYSTEMS:  Complete review of systems performed, pertinents noted above, all other systems are negative. PHYSICAL EXAMINATION:  Vital sign assessment reveal a blood pressure of 140/94 and pulse rate of 54.  Cardiovascular exam has a heart with a RRR, normal S1 and S2. No murmur present. There are no rubs or gallops. Good peripheral pulses. No jugular venous distension. Respiratory exam reveals clear lung fields, no rales or rhonchi. Lymphatic exam reveals no edema. Neurologic exam is nonfocal.      Recent labs results and imaging reviewed. Notable findings include:   Recent Results (from the past 24 hour(s))   EKG, 12 LEAD, INITIAL    Collection Time: 01/20/22  8:50 AM   Result Value Ref Range    Ventricular Rate 48 BPM    Atrial Rate 48 BPM    P-R Interval 125 ms    QRS Duration 148 ms    Q-T Interval 490 ms    QTC Calculation (Bezet) 438 ms    Calculated P Axis -30 degrees    Calculated R Axis -73 degrees    Calculated T Axis -44 degrees    Diagnosis       Sinus bradycardia  RBBB and LAFB  Probable left ventricular hypertrophy  Abnormal T, consider ischemia, inferior leads     CBC WITH AUTOMATED DIFF    Collection Time: 01/20/22  8:55 AM   Result Value Ref Range    WBC 8.1 4.6 - 13.2 K/uL    RBC 5.51 (H) 4.20 - 5.30 M/uL    HGB 14.3 12.0 - 16.0 g/dL    HCT 43.0 35.0 - 45.0 %    MCV 78.0 78.0 - 100.0 FL    MCH 26.0 24.0 - 34.0 PG    MCHC 33.3 31.0 - 37.0 g/dL    RDW 14.1 11.6 - 14.5 %    PLATELET 232 403 - 040 K/uL    MPV 11.2 9.2 - 11.8 FL    NRBC 0.0 0.0  WBC    ABSOLUTE NRBC 0.00 0.00 - 0.01 K/uL    NEUTROPHILS 69 40 - 73 %    LYMPHOCYTES 24 21 - 52 %    MONOCYTES 7 3 - 10 %    EOSINOPHILS 0 0 - 5 %    BASOPHILS 0 0 - 2 %    IMMATURE GRANULOCYTES 0 0 - 0.5 %    ABS. NEUTROPHILS 5.5 1.8 - 8.0 K/UL    ABS. LYMPHOCYTES 1.9 0.9 - 3.6 K/UL    ABS. MONOCYTES 0.5 0.05 - 1.2 K/UL    ABS. EOSINOPHILS 0.0 0.0 - 0.4 K/UL    ABS. BASOPHILS 0.0 0.0 - 0.1 K/UL    ABS. IMM.  GRANS. 0.0 0.00 - 0.04 K/UL    DF AUTOMATED     METABOLIC PANEL, BASIC    Collection Time: 01/20/22  8:55 AM   Result Value Ref Range    Sodium 136 135 - 145 mmol/L    Potassium 3.0 (L) 3.2 - 5.1 mmol/L    Chloride 97 94 - 111 mmol/L    CO2 24 21 - 33 mmol/L    Anion gap 15 mmol/L Glucose 81 70 - 110 mg/dL    BUN 14 9 - 21 mg/dL    Creatinine 0.80 0.70 - 1.20 mg/dL    BUN/Creatinine ratio 18      GFR est AA >60 ml/min/1.73m2    GFR est non-AA >60 ml/min/1.73m2    Calcium 9.5 8.5 - 10.5 mg/dL   TROPONIN I    Collection Time: 01/20/22  8:55 AM   Result Value Ref Range    Troponin-I, Qt. 0.02 0.02 - 0.05 ng/mL   LIPASE    Collection Time: 01/20/22  8:55 AM   Result Value Ref Range    Lipase 16 10 - 57 U/L   LACTIC ACID    Collection Time: 01/20/22  8:55 AM   Result Value Ref Range    Lactic acid 2.2 (HH) 0.5 - 2.0 mmol/L   MAGNESIUM    Collection Time: 01/20/22  8:55 AM   Result Value Ref Range    Magnesium 1.8 1.7 - 2.8 mg/dL   HEPATIC FUNCTION PANEL    Collection Time: 01/20/22  8:55 AM   Result Value Ref Range    Protein, total 7.6 6.1 - 8.4 g/dL    Albumin 3.9 3.5 - 4.7 g/dL    Globulin 3.7 g/dL    A-G Ratio 1.1      Bilirubin, total 1.0 0.2 - 1.0 mg/dL    Bilirubin, direct 0.1 0.0 - 0.3 mg/dL    Alk. phosphatase 128 (H) 38 - 126 U/L    AST (SGOT) 16 14 - 74 U/L    ALT (SGPT) 15 3 - 52 U/L   EKG, 12 LEAD, INITIAL    Collection Time: 01/20/22 10:23 AM   Result Value Ref Range    Ventricular Rate 49 BPM    Atrial Rate 50 BPM    P-R Interval 138 ms    QRS Duration 145 ms    Q-T Interval 499 ms    QTC Calculation (Bezet) 451 ms    Calculated P Axis 23 degrees    Calculated R Axis -69 degrees    Calculated T Axis -50 degrees    Diagnosis       Sinus bradycardia  RBBB and LAFB  Probable left ventricular hypertrophy  Marked ST abnormality, possible inferior subendocardial injury  When compared with ECG of 20-Jan-2022  No significant change was found  Confirmed by [address] [place] [address] ([id number]) on 1/20/2022 2:01:51 PM     TROPONIN I    Collection Time: 01/20/22 11:45 AM   Result Value Ref Range    Troponin-I, Qt. 0.02 0.02 - 0.05 ng/mL     Discussed case with Dr. Janny Kunz, and our impression and recommendations are as follows:  1.  Abnormal EKG: T wave inversions noted in inferior leads, with RBBB and left anterior fascicular block. Old EKG was obtained for comparison from [facility] from her last visit in 10/2021, and no appreciable change is noted. Troponins x 2 are negative. 2. Electrolyte abnormalities: Serum potassium is depleted at 3.0, and serum magnesium is low at 1.8. Potassium repletion has been ordered. Will give 2 grams IV magnesium. Keep serum potassium between 4-5 and serum magnesium > 2.  3. Epigastric pain: CT of abdomen and pelvis indicates SBO. Discussed with general surgery who plans conservative management at this time with NG tube and NPO. Thank you for involving us in the care of this patient. Will sign off, as there are no acute cardiac needs at this time. Please reconsult should any acute needs arise. Please do not hesitate to call me or Dr. Jean Paul Lugo if additional questions arise.

## 2022-01-21 ENCOUNTER — APPOINTMENT (OUTPATIENT)
Dept: GENERAL RADIOLOGY | Age: 67
DRG: 389 | End: 2022-01-21
Attending: SURGERY
Payer: COMMERCIAL

## 2022-01-21 PROBLEM — E44.0 MODERATE PROTEIN-CALORIE MALNUTRITION (HCC): Status: ACTIVE | Noted: 2022-01-21

## 2022-01-21 LAB
ANION GAP SERPL CALC-SCNC: 7 MMOL/L
BASOPHILS # BLD: 0 K/UL (ref 0–0.1)
BASOPHILS NFR BLD: 0 % (ref 0–2)
BUN SERPL-MCNC: 12 MG/DL (ref 9–21)
BUN/CREAT SERPL: 20
CA-I BLD-MCNC: 8.9 MG/DL (ref 8.5–10.5)
CHLORIDE SERPL-SCNC: 104 MMOL/L (ref 94–111)
CO2 SERPL-SCNC: 24 MMOL/L (ref 21–33)
CREAT SERPL-MCNC: 0.6 MG/DL (ref 0.7–1.2)
DIFFERENTIAL METHOD BLD: ABNORMAL
EOSINOPHIL # BLD: 0 K/UL (ref 0–0.4)
EOSINOPHIL NFR BLD: 0 % (ref 0–5)
ERYTHROCYTE [DISTWIDTH] IN BLOOD BY AUTOMATED COUNT: 13.9 % (ref 11.6–14.5)
GLUCOSE SERPL-MCNC: 93 MG/DL (ref 70–110)
HCT VFR BLD AUTO: 38.7 % (ref 35–45)
HGB BLD-MCNC: 12.9 G/DL (ref 12–16)
IMM GRANULOCYTES # BLD AUTO: 0 K/UL (ref 0–0.04)
IMM GRANULOCYTES NFR BLD AUTO: 0 % (ref 0–0.5)
LYMPHOCYTES # BLD: 1.8 K/UL (ref 0.9–3.6)
LYMPHOCYTES NFR BLD: 28 % (ref 21–52)
MAGNESIUM SERPL-MCNC: 1.8 MG/DL (ref 1.7–2.8)
MCH RBC QN AUTO: 25.7 PG (ref 24–34)
MCHC RBC AUTO-ENTMCNC: 33.3 G/DL (ref 31–37)
MCV RBC AUTO: 77.2 FL (ref 78–100)
MONOCYTES # BLD: 0.8 K/UL (ref 0.05–1.2)
MONOCYTES NFR BLD: 13 % (ref 3–10)
NEUTS SEG # BLD: 3.6 K/UL (ref 1.8–8)
NEUTS SEG NFR BLD: 59 % (ref 40–73)
NRBC # BLD: 0 K/UL (ref 0–0.01)
NRBC BLD-RTO: 0 PER 100 WBC
PLATELET # BLD AUTO: 211 K/UL (ref 135–420)
PMV BLD AUTO: 11.4 FL (ref 9.2–11.8)
POTASSIUM SERPL-SCNC: 3.6 MMOL/L (ref 3.2–5.1)
RBC # BLD AUTO: 5.01 M/UL (ref 4.2–5.3)
SODIUM SERPL-SCNC: 135 MMOL/L (ref 135–145)
WBC # BLD AUTO: 6.2 K/UL (ref 4.6–13.2)

## 2022-01-21 PROCEDURE — 74011250636 HC RX REV CODE- 250/636: Performed by: SURGERY

## 2022-01-21 PROCEDURE — 74011000250 HC RX REV CODE- 250: Performed by: PHYSICIAN ASSISTANT

## 2022-01-21 PROCEDURE — 80048 BASIC METABOLIC PNL TOTAL CA: CPT

## 2022-01-21 PROCEDURE — 85025 COMPLETE CBC W/AUTO DIFF WBC: CPT

## 2022-01-21 PROCEDURE — 74018 RADEX ABDOMEN 1 VIEW: CPT

## 2022-01-21 PROCEDURE — 74011250636 HC RX REV CODE- 250/636: Performed by: PHYSICIAN ASSISTANT

## 2022-01-21 PROCEDURE — 65270000029 HC RM PRIVATE

## 2022-01-21 PROCEDURE — 83735 ASSAY OF MAGNESIUM: CPT

## 2022-01-21 RX ORDER — MORPHINE SULFATE 2 MG/ML
1 INJECTION, SOLUTION INTRAMUSCULAR; INTRAVENOUS ONCE
Status: COMPLETED | OUTPATIENT
Start: 2022-01-21 | End: 2022-01-21

## 2022-01-21 RX ORDER — LEVOFLOXACIN 5 MG/ML
750 INJECTION, SOLUTION INTRAVENOUS EVERY 24 HOURS
Status: DISCONTINUED | OUTPATIENT
Start: 2022-01-21 | End: 2022-01-24

## 2022-01-21 RX ADMIN — POTASSIUM CHLORIDE, DEXTROSE MONOHYDRATE AND SODIUM CHLORIDE 125 ML/HR: 150; 5; 450 INJECTION, SOLUTION INTRAVENOUS at 18:32

## 2022-01-21 RX ADMIN — POTASSIUM CHLORIDE, DEXTROSE MONOHYDRATE AND SODIUM CHLORIDE 100 ML/HR: 150; 5; 450 INJECTION, SOLUTION INTRAVENOUS at 04:42

## 2022-01-21 RX ADMIN — FAMOTIDINE 20 MG: 10 INJECTION, SOLUTION INTRAVENOUS at 11:06

## 2022-01-21 RX ADMIN — MORPHINE SULFATE 1 MG: 2 INJECTION, SOLUTION INTRAMUSCULAR; INTRAVENOUS at 22:10

## 2022-01-21 RX ADMIN — LEVOFLOXACIN 750 MG: 5 INJECTION, SOLUTION INTRAVENOUS at 11:15

## 2022-01-21 RX ADMIN — FAMOTIDINE 20 MG: 10 INJECTION, SOLUTION INTRAVENOUS at 22:10

## 2022-01-21 NOTE — PROGRESS NOTES
[address] 89 care of patient    2000-last bag of potassium started. Assessment completed. No needs voiced. CBWR.    5235-scheduled medications given. PRN ativan given for anxiety. No needs voiced. CBWR    7062-VS obtained. Labs obtained. No needs voiced.  CBWr Yes

## 2022-01-21 NOTE — PROGRESS NOTES
Problem: Falls - Risk of  Goal: *Absence of Falls  Description: Document K[place]ley Fail Fall Risk and appropriate interventions in the flowsheet.   Outcome: Progressing Towards Goal  Note: Fall Risk Interventions:            Medication Interventions: Teach patient to arise slowly,Patient to call before getting OOB                   Problem: Patient Education: Go to Patient Education Activity  Goal: Patient/Family Education  Outcome: Progressing Towards Goal     Problem: Nausea/Vomiting (Adult)  Goal: *Absence of nausea/vomiting  Outcome: Progressing Towards Goal

## 2022-01-21 NOTE — PROGRESS NOTES
Care Management Interventions  PCP Verified by CM: Yes  Palliative Care Criteria Met (RRAT>21 & CHF Dx)?: No  Mode of Transport at Discharge: Other (see comment) (Family)  Transition of Care Consult (CM Consult): Discharge Planning  MyChart Signup: No  Discharge Durable Medical Equipment: No  Health Maintenance Reviewed: Yes  Physical Therapy Consult: No  Occupational Therapy Consult: No  Speech Therapy Consult: No  Support Systems: Spouse/Significant Other  Confirm Follow Up Transport: Family  The Patient and/or Patient Representative was Provided with a Choice of Provider and Agrees with the Discharge Plan?: Yes  Freedom of Choice List was Provided with Basic Dialogue that Supports the Patient's Individualized Plan of Care/Goals, Treatment Preferences and Shares the Quality Data Associated with the Providers?: Yes  Discharge Location  Patient Expects to be Discharged to[de-identified] Home   Reason for Admission: Chart reviewed and noted patient presented with C/O epigastric abdominal pain associated with N&V for 5 days. Pt initially attributed it to seafood she ate on Sunday, and then the discomfort persisted with burning in nature radiating to her back. Also she was having SOB and diaphoresis. While in the ER she was found to have a partial SBO and low potassium.      Dx: Mid-epigastric Pain    PMH: MI x 2- has 3 stents, HTN, HLD                     RUR Score: 6%                    Plan for utilizing home health:  TBD        PCP: First and Last name:  None- Dr Michael Wharton      Name of Practice:    Are you a current patient: Yes/No:    Approximate date of last visit:    Can you participate in a virtual visit with your PCP:                     Current Advanced Directive/Advance Care Plan: No Order- No ACP      Healthcare Decision Maker: - Indira Florez- [phone]  Click here to [address] including selection of the Healthcare Decision Maker Relationship (ie \"Primary\") Transition of Care Plan: Discharge planning aimed at transition to home. Pt lives at home with her  and doesn't use any DME. She plans to return back home at discharge.

## 2022-01-21 NOTE — PROGRESS NOTES
Progress Note  Date:2022       Room:[facility]  Patient Hope Reddy     YOB: 1955     Age:66 y.o. Subjective    Subjective patient reports her colicky abdominal discomfort is lessened. She denies any passage of flatus. Review of Systems   Constitutional: Negative for fever. All other systems reviewed and are negative. Objective         Vitals Last 24 Hours:  TEMPERATURE:  Temp  Av.2 °F (36.2 °C)  Min: 96.4 °F (35.8 °C)  Max: 97.8 °F (36.6 °C)  RESPIRATIONS RANGE: Resp  Av.2  Min: 18  Max: 19  PULSE OXIMETRY RANGE: SpO2  Av %  Min: 95 %  Max: 99 %  PULSE RANGE: Pulse  Av  Min: 58  Max: 71  BLOOD PRESSURE RANGE: Systolic (34QEB), UKL:784 , Min:153 , GLQ:527   ; Diastolic (59VVX), PYM:52, Min:61, Max:93    I/O (24Hr): Intake/Output Summary (Last 24 hours) at 2022 1158  Last data filed at 2022 0450  Gross per 24 hour   Intake 1593.33 ml   Output 750 ml   Net 843.33 ml     Objective:  General Appearance:  Comfortable, well-appearing and in no acute distress. Vital signs: (most recent): Blood pressure (!) 153/78, pulse 71, temperature 97.8 °F (36.6 °C), resp. rate 18, height 5' 2\" (1.575 m), weight 71.7 kg (158 lb), SpO2 95 %. No fever. Output: Producing urine. HEENT: Normal HEENT exam.    Lungs:  Normal effort. Heart: Normal rate. Abdomen: Abdomen is soft. There is no abdominal tenderness. Labs/Imaging/Diagnostics    Labs:  CBC:  Recent Labs     22  0530 22  0855   WBC 6.2 8.1   RBC 5.01 5.51*   HGB 12.9 14.3   HCT 38.7 43.0   MCV 77.2* 78.0   RDW 13.9 14.1    252     CHEMISTRIES:  Recent Labs     22  0530 22  0855    136   K 3.6 3.0*    97   CO2 24 24   BUN 12 14   CA 8.9 9.5   MG 1.8 1.8   PT/INR:No results for input(s): INR, INREXT in the last 72 hours. No lab exists for component: PROTIME  APTT:No results for input(s):  APTT in the last 72 hours.  LIVER PROFILE:  Recent Labs     01/20/22  0855   AST 16   ALT 15     Lab Results   Component Value Date/Time    ALT (SGPT) 15 01/20/2022 08:55 AM    AST (SGOT) 16 01/20/2022 08:55 AM    Alk. phosphatase 128 (H) 01/20/2022 08:55 AM    Bilirubin, direct 0.1 01/20/2022 08:55 AM    Bilirubin, total 1.0 01/20/2022 08:55 AM       Imaging Last 24 Hours:  XR ABD (KUB)    Result Date: 1/21/2022  EXAM:  Abdomen CLINICAL INDICATION/HISTORY: follow small bowel obstruction. -Additional: None COMPARISON: 01/20/22 TECHNIQUE:  A single AP view of the abdomen was performed. _______________ FINDINGS: Air distended loop of small bowel is again noted within the mid abdomen. The bowel distention is similar to prior imaging. No organomegaly or abnormal soft tissue masses are present. No gross free air is demonstrated. Enteric catheter tip is located within the stomach. The side-port of the catheter is located near the GE junction. Chronic lumbar spondylosis is present. Atelectasis appears evident at both lung bases. _______________     Continued distention of a small bowel loop within the right midabdomen    XR ABD FLAT/ ERECT    Result Date: 1/20/2022  EXAM: XR ABD FLAT/ ERECT CLINICAL INDICATION/HISTORY: Abdominal pain and distention COMPARISON: CT abdomen/pelvis same day TECHNIQUE: Supine and upright AP views of the abdomen were obtained. _______________ FINDINGS: Interval placement of an enteric tube, coiling in the left upper quadrant with tip and side-port in the region of the gastric body. Mildly air distended bowel loops are seen within the left upper quadrant and central abdomen, measuring up to 3.4 cm in diameter. Layering air-fluid levels are also seen on the upright projection. The soft tissue planes and bony structures appear normal.  _______________     Small bowel obstruction with layering air-fluid levels. Adequately positioned enteric tube.      Assessment//Plan   Principal Problem:    Small bowel obstruction [facility]) (1/20/2022)    Active Problems:    Abnormal EKG (1/20/2022)      Bradycardia (1/20/2022)      Assessment & Plan 55-year-old female hospital day #1 for possible partial small bowel obstruction secondary to adhesions. Patient symptoms are improved. Abdominal films from this a.m. demonstrate air throughout the colon there is a distended loop of small bowel. Would recommend continuing nasogastric decompression, hydration, electrolyte maintenance, and will repeat abdominal films in a.m. I have discussed with the patient treatment plan and potential for exploratory laparotomy if she does not respond to current treatment plan. Patient does have gallstones documented on CT scan however no evidence of cholecystitis and no symptoms consistent with cholecystitis at this time. Will empirically cover her with broad-spectrum antibiotics.     Electronically signed by Brigida Olszewski, MD on 1/21/2022 at 11:58 AM

## 2022-01-21 NOTE — PROGRESS NOTES
Hospitalist Progress Note             Date of Service:  2022  NAME:  Sheila Dyer  :  1955  MRN:  [id number]    Assessment / Plan:       Patient Active Problem List     Diagnosis Date Noted    Small bowel obstruction ([facility] 2022    Abnormal EKG 2022   Small bowel obstruction ([facility]  - Evaluated by general surgery  - NG to suction and n.p.o.  - Repeat abdominal study- reviewed by surgery  - no flatus yet, no bm, limited bs, keep ngt in place, no diet yet     Abnormal EKG  -Rhythm: sinus bradycardia; and regular . Rate (approx.): 48; Axis: left axis deviation; WI interval: normal; QRS interval: prolonged; ST/T wave: T wave inverted; Other findings: QTc 438. Deeply inverted T waves in II; III; aVF. No ST elevation noted in I or aVL. .  -Evaluated by cardiology  -We will maintain potassium and magnesium at 4 and 2 respectively     Bradycardia  -Evaluated by cardiology  -Pulse ranging between 48 and 59  -Blood pressure mildly elevated on admission  - Telemetry           Code Status: Full code        DVT Prophylaxis: Lovenox SubQ     GI Prophylaxis: Pepcid twice daily           Hospital Problems  Date Reviewed: 2022          Codes Class Noted POA    * (Principal) Small bowel obstruction ([facility] ICD-10-CM: Z78.413  ICD-9-CM: 560.9  2022 Unknown        Abnormal EKG ICD-10-CM: R94.31  ICD-9-CM: 794.31  2022 Unknown        Bradycardia ICD-10-CM: R00.1  ICD-9-CM: 427.89  2022 Unknown                Review of Systems:   A comprehensive review of systems was negative except for that written in the HPI. Vital Signs:    Last 24hrs VS reviewed since prior progress note.  Most recent are:  Visit Vitals  BP (!) 153/78 (BP 1 Location: Left upper arm, BP Patient Position: At rest)   Pulse 71   Temp 97.8 °F (36.6 °C)   Resp 18   Ht 5' 2\" (1.575 m)   Wt 71.7 kg (158 lb)   SpO2 95%   BMI 28.90 kg/m²         Intake/Output Summary (Last 24 hours) at 1/21/2022 1021  Last data filed at 1/21/2022 0450  Gross per 24 hour   Intake 1593.33 ml   Output 750 ml   Net 843.33 ml        Physical Examination:             General:          Alert, cooperative, no distress, appears stated age. + pale  HEENT:           Atraumatic, anicteric sclerae, pink conjunctivae                          No oral ulcers, mucosa moist, throat clear, dentition fair  Neck:               Supple, symmetrical  Lungs:             Clear to auscultation bilaterally. No Wheezing or Rhonchi. No rales. Chest wall:      No tenderness  No Accessory muscle use. Heart:              Regular  rhythm,  No  murmur   No edema  Abdomen:        mild tender, no bs heard  Extremities:     No cyanosis. No clubbing,                            Skin turgor normal, Capillary refill normal  Skin:                Not pale. Not Jaundiced  No rashes   Psych:             Not anxious or agitated. Neurologic:      Alert, moves all extremities, answers questions appropriately and responds to commands        Data Review:    Review and/or order of clinical lab test  Review and/or order of tests in the radiology section of CPT  Review and/or order of tests in the medicine section of CPT      Labs:     Recent Labs     01/21/22  0530 01/20/22  0855   WBC 6.2 8.1   HGB 12.9 14.3   HCT 38.7 43.0    252     Recent Labs     01/21/22  0530 01/20/22  0855    136   K 3.6 3.0*    97   CO2 24 24   BUN 12 14   CREA 0.60* 0.80   GLU 93 81   CA 8.9 9.5   MG 1.8 1.8     Recent Labs     01/20/22  0855   ALT 15   *   TBILI 1.0   TP 7.6   ALB 3.9   GLOB 3.7   LPSE 16     No results for input(s): INR, PTP, APTT, INREXT in the last 72 hours. No results for input(s): FE, TIBC, PSAT, FERR in the last 72 hours. No results found for: FOL, RBCF   No results for input(s): PH, PCO2, PO2 in the last 72 hours.   Recent Labs     01/20/22  1145 01/20/22  0855   TROIQ 0.02 0.02     No results found for: CHOL, CHOLX, CHLST, CHOLV, HDL, HDLP, LDL, LDLC, DLDLP, TGLX, TRIGL, TRIGP, CHHD, CHHDX  No results found for: GLUCPOC  No results found for: COLOR, APPRN, SPGRU, REFSG, KAMARI, PROTU, GLUCU, KETU, BILU, UROU, DEANNE, LEUKU, GLUKE, EPSU, BACTU, WBCU, RBCU, CASTS, UCRY      Medications Reviewed:     Current Facility-Administered Medications   Medication Dose Route Frequency    levoFLOXacin (LEVAQUIN) 750 mg in D5W IVPB  750 mg IntraVENous Q24H    dextrose 5% - 0.45% NaCl with KCl 20 mEq/L infusion  125 mL/hr IntraVENous CONTINUOUS    famotidine (PF) (PEPCID) 20 mg in 0.9% sodium chloride 10 mL injection  20 mg IntraVENous Q12H    LORazepam (ATIVAN) injection 1 mg  1 mg IntraVENous Q6H PRN     ______________________________________________________________________  EXPECTED LENGTH OF STAY: - - -  ACTUAL LENGTH OF STAY:          1                 Micah Pelletier MD

## 2022-01-21 NOTE — PROGRESS NOTES
Admission Medication Reconciliation:    Information obtained from:  patient herself    Comments/Recommendations: Reviewed PTA medications and patient's allergies. Medications updated. Allergies:  Penicillins    Significant PMH/Disease States:   Past Medical History:   Diagnosis Date    Heart attack ([address]     x 2, stents placed x 5     Chief Complaint for this Admission:    Chief Complaint   Patient presents with    Vomiting     Prior to Admission Medications:   Prior to Admission Medications   Prescriptions Last Dose Informant Patient Reported? Taking? Calcium Carbonate-Vit D3-Min 600 mg calcium- 400 unit tab 1/13/2022 at Unknown time  Yes Yes   Sig: Take 1 Tablet by mouth daily. aspirin (Wendy Chewable Aspirin) 81 mg chewable tablet 1/13/2022 at Unknown time  Yes Yes   Sig: aspirin 81 mg tablet   Take 1 tablet every day by oral route. atorvastatin (LIPITOR) 40 mg tablet 1/13/2022 at Unknown time  Yes Yes   Sig: Take 40 mg by mouth nightly. carvediloL (COREG) 6.25 mg tablet 1/13/2022 at Unknown time  Yes Yes   Sig: Take 6.25 mg by mouth two (2) times daily (with meals). folic acid 578 mcg tablet 1/13/2022 at Unknown time  Yes Yes   Sig: Take 400 mcg by mouth daily. nitroglycerin (NITROSTAT) 0.4 mg SL tablet Unknown at Unknown time  Yes No   Sig: place 1 tablet under the tongue if needed every 5 minutes for jonnie...  (REFER TO PRESCRIPTION NOTES).       Facility-Administered Medications: None       Jocelyn Le, 67 [address]

## 2022-01-21 NOTE — PROGRESS NOTES
Comprehensive Nutrition Assessment    Type and Reason for Visit: Initial    Nutrition Recommendations/Plan: NPO   Once SBO resolved recommend clear liquids diet with ensure clear TID advance as tolerated to a cardiac diet and can stop ensure clear then. Nutrition Assessment:  72 yo female PMH: MI, HLD, HTN   Overweight BMI 28.9. Pt with hx of N/V x 5 days originally attributed to eating seafood on Sunday, but now comes in revealing has partial SBO 2/2 adhesions. per GI continue NPO with NGT for suction. Recommend once pt starts clear liquids diet provide ensure clear TID until diet can be advanced  K+ 3.0 on admission improved to 3.6 with IV K+    Recent Results (from the past 24 hour(s))   METABOLIC PANEL, BASIC    Collection Time: 01/21/22  5:30 AM   Result Value Ref Range    Sodium 135 135 - 145 mmol/L    Potassium 3.6 3.2 - 5.1 mmol/L    Chloride 104 94 - 111 mmol/L    CO2 24 21 - 33 mmol/L    Anion gap 7 mmol/L    Glucose 93 70 - 110 mg/dL    BUN 12 9 - 21 mg/dL    Creatinine 0.60 (L) 0.70 - 1.20 mg/dL    BUN/Creatinine ratio 20      GFR est AA >60 ml/min/1.73m2    GFR est non-AA >60 ml/min/1.73m2    Calcium 8.9 8.5 - 10.5 mg/dL   CBC WITH AUTOMATED DIFF    Collection Time: 01/21/22  5:30 AM   Result Value Ref Range    WBC 6.2 4.6 - 13.2 K/uL    RBC 5.01 4.20 - 5.30 M/uL    HGB 12.9 12.0 - 16.0 g/dL    HCT 38.7 35.0 - 45.0 %    MCV 77.2 (L) 78.0 - 100.0 FL    MCH 25.7 24.0 - 34.0 PG    MCHC 33.3 31.0 - 37.0 g/dL    RDW 13.9 11.6 - 14.5 %    PLATELET 295 214 - 485 K/uL    MPV 11.4 9.2 - 11.8 FL    NRBC 0.0 0.0  WBC    ABSOLUTE NRBC 0.00 0.00 - 0.01 K/uL    NEUTROPHILS 59 40 - 73 %    LYMPHOCYTES 28 21 - 52 %    MONOCYTES 13 (H) 3 - 10 %    EOSINOPHILS 0 0 - 5 %    BASOPHILS 0 0 - 2 %    IMMATURE GRANULOCYTES 0 0 - 0.5 %    ABS. NEUTROPHILS 3.6 1.8 - 8.0 K/UL    ABS. LYMPHOCYTES 1.8 0.9 - 3.6 K/UL    ABS. MONOCYTES 0.8 0.05 - 1.2 K/UL    ABS. EOSINOPHILS 0.0 0.0 - 0.4 K/UL    ABS.  BASOPHILS 0.0 0.0 - 0.1 K/UL    ABS. IMM. GRANS. 0.0 0.00 - 0.04 K/UL    DF AUTOMATED     MAGNESIUM    Collection Time: 01/21/22  5:30 AM   Result Value Ref Range    Magnesium 1.8 1.7 - 2.8 mg/dL       Malnutrition Assessment:  Malnutrition Status: Moderate malnutrition (N/V x 5 days and now NPO for SBO admitted with K+ 3.0)    Context:  Acute illness     Findings of the 6 clinical characteristics of malnutrition:   Energy Intake:  7 - 50% or less of est energy requirements for 5 or more days (N/V x 5 days)  Weight Loss:  1 - 1% to 2% over 1 week     Body Fat Loss:  No significant body fat loss,     Muscle Mass Loss:  No significant muscle mass loss,    Fluid Accumulation:  No significant fluid accumulation,     Strength:  Normal  strength         Estimated Daily Nutrient Needs:  Energy (kcal): 5993-1213 kcal/day; Weight Used for Energy Requirements: Admission (72 kg)  Protein (g): 58 g/day; Weight Used for Protein Requirements: Admission (0.8-1 g/kg)  Fluid (ml/day): 0348-6292 mL/day; Method Used for Fluid Requirements: 1 ml/kcal      Nutrition Related Findings:  Overweight BMI 28.9. Pt with hx of N/V x 5 days originally attributed to eating seafood on Sunday, but now comes in revealing has partial SBO 2/2 adhesions. per GI continue NPO with NGT for suction. Wounds:    None       Current Nutrition Therapies:  No diet orders on file    Anthropometric Measures:  · Height:  5' 2\" (157.5 cm)  · Current Body Wt:  71.7 kg (158 lb)   · Admission Body Wt:  158 lb    · Usual Body Wt:        · Ideal Body Wt:  110 lbs:  143.6 %   · Adjusted Body Weight:   ; Weight Adjustment for: No adjustment   · Adjusted BMI:       · BMI Category: Overweight (BMI 25.0-29. 9)       Nutrition Diagnosis:   · Predicted inadequate energy intake related to altered GI function,altered GI structure as evidenced by vomiting,nausea,NPO or clear liquid status due to medical condition      Nutrition Interventions:   Food and/or Nutrient Delivery: Continue NPO  Nutrition Education and Counseling: Education not appropriate  Coordination of Nutrition Care: Continue to monitor while inpatient    Goals:  K+ 3.5-5.1, pt to eat greater than 75% of meals once diet starts, BM every 1-3 days       Nutrition Monitoring and Evaluation:   Behavioral-Environmental Outcomes:    Food/Nutrient Intake Outcomes: Food and nutrient intake,Diet advancement/tolerance  Physical Signs/Symptoms Outcomes: Biochemical data,Meal time behavior,Weight     F/u: 1/24/2022    Discharge Planning:     Too soon to determine     Electronically signed by Juan R May on 1/21/2022 at 3:21 PM    Contact: YAMIL [phone]

## 2022-01-22 ENCOUNTER — APPOINTMENT (OUTPATIENT)
Dept: GENERAL RADIOLOGY | Age: 67
DRG: 389 | End: 2022-01-22
Attending: SURGERY
Payer: COMMERCIAL

## 2022-01-22 LAB
ALBUMIN SERPL-MCNC: 3.3 G/DL (ref 3.5–4.7)
ALBUMIN/GLOB SERPL: 1 {RATIO}
ALP SERPL-CCNC: 100 U/L (ref 38–126)
ALT SERPL-CCNC: 9 U/L (ref 3–52)
ANION GAP SERPL CALC-SCNC: 11 MMOL/L
AST SERPL W P-5'-P-CCNC: 13 U/L (ref 14–74)
BASOPHILS # BLD: 0 K/UL (ref 0–0.1)
BASOPHILS NFR BLD: 0 % (ref 0–2)
BILIRUB SERPL-MCNC: 0.4 MG/DL (ref 0.2–1)
BUN SERPL-MCNC: 7 MG/DL (ref 9–21)
BUN/CREAT SERPL: 10
CA-I BLD-MCNC: 8.9 MG/DL (ref 8.5–10.5)
CHLORIDE SERPL-SCNC: 102 MMOL/L (ref 94–111)
CO2 SERPL-SCNC: 21 MMOL/L (ref 21–33)
CREAT SERPL-MCNC: 0.7 MG/DL (ref 0.7–1.2)
DIFFERENTIAL METHOD BLD: ABNORMAL
EOSINOPHIL # BLD: 0 K/UL (ref 0–0.4)
EOSINOPHIL NFR BLD: 0 % (ref 0–5)
ERYTHROCYTE [DISTWIDTH] IN BLOOD BY AUTOMATED COUNT: 14 % (ref 11.6–14.5)
GLOBULIN SER CALC-MCNC: 3.2 G/DL
GLUCOSE SERPL-MCNC: 140 MG/DL (ref 70–110)
HCT VFR BLD AUTO: 39.2 % (ref 35–45)
HGB BLD-MCNC: 13 G/DL (ref 12–16)
IMM GRANULOCYTES # BLD AUTO: 0 K/UL (ref 0–0.04)
IMM GRANULOCYTES NFR BLD AUTO: 0 % (ref 0–0.5)
LYMPHOCYTES # BLD: 1.3 K/UL (ref 0.9–3.6)
LYMPHOCYTES NFR BLD: 20 % (ref 21–52)
MAGNESIUM SERPL-MCNC: 1.6 MG/DL (ref 1.7–2.8)
MCH RBC QN AUTO: 25.7 PG (ref 24–34)
MCHC RBC AUTO-ENTMCNC: 33.2 G/DL (ref 31–37)
MCV RBC AUTO: 77.6 FL (ref 78–100)
MONOCYTES # BLD: 0.7 K/UL (ref 0.05–1.2)
MONOCYTES NFR BLD: 12 % (ref 3–10)
NEUTS SEG # BLD: 4.3 K/UL (ref 1.8–8)
NEUTS SEG NFR BLD: 68 % (ref 40–73)
NRBC # BLD: 0 K/UL (ref 0–0.01)
NRBC BLD-RTO: 0 PER 100 WBC
PHOSPHATE SERPL-MCNC: 2.6 MG/DL (ref 2.5–4.5)
PLATELET # BLD AUTO: 190 K/UL (ref 135–420)
PMV BLD AUTO: 10.9 FL (ref 9.2–11.8)
POTASSIUM SERPL-SCNC: 3.9 MMOL/L (ref 3.2–5.1)
PROT SERPL-MCNC: 6.5 G/DL (ref 6.1–8.4)
RBC # BLD AUTO: 5.05 M/UL (ref 4.2–5.3)
SODIUM SERPL-SCNC: 134 MMOL/L (ref 135–145)
WBC # BLD AUTO: 6.4 K/UL (ref 4.6–13.2)

## 2022-01-22 PROCEDURE — 74018 RADEX ABDOMEN 1 VIEW: CPT

## 2022-01-22 PROCEDURE — 85025 COMPLETE CBC W/AUTO DIFF WBC: CPT

## 2022-01-22 PROCEDURE — 80053 COMPREHEN METABOLIC PANEL: CPT

## 2022-01-22 PROCEDURE — 74011250636 HC RX REV CODE- 250/636: Performed by: INTERNAL MEDICINE

## 2022-01-22 PROCEDURE — 84100 ASSAY OF PHOSPHORUS: CPT

## 2022-01-22 PROCEDURE — 74022 RADEX COMPL AQT ABD SERIES: CPT

## 2022-01-22 PROCEDURE — 83735 ASSAY OF MAGNESIUM: CPT

## 2022-01-22 PROCEDURE — 74011000250 HC RX REV CODE- 250: Performed by: PHYSICIAN ASSISTANT

## 2022-01-22 PROCEDURE — 74011250636 HC RX REV CODE- 250/636: Performed by: PHYSICIAN ASSISTANT

## 2022-01-22 PROCEDURE — 74011250636 HC RX REV CODE- 250/636: Performed by: SURGERY

## 2022-01-22 PROCEDURE — 36415 COLL VENOUS BLD VENIPUNCTURE: CPT

## 2022-01-22 PROCEDURE — 65270000029 HC RM PRIVATE

## 2022-01-22 RX ORDER — ONDANSETRON 2 MG/ML
4 INJECTION INTRAMUSCULAR; INTRAVENOUS
Status: DISCONTINUED | OUTPATIENT
Start: 2022-01-22 | End: 2022-01-30 | Stop reason: HOSPADM

## 2022-01-22 RX ORDER — MAGNESIUM SULFATE 1 G/100ML
1 INJECTION INTRAVENOUS
Status: COMPLETED | OUTPATIENT
Start: 2022-01-22 | End: 2022-01-22

## 2022-01-22 RX ADMIN — MAGNESIUM SULFATE HEPTAHYDRATE 1 G: 1 INJECTION, SOLUTION INTRAVENOUS at 10:00

## 2022-01-22 RX ADMIN — FAMOTIDINE 20 MG: 10 INJECTION, SOLUTION INTRAVENOUS at 20:52

## 2022-01-22 RX ADMIN — LEVOFLOXACIN 750 MG: 5 INJECTION, SOLUTION INTRAVENOUS at 11:07

## 2022-01-22 RX ADMIN — POTASSIUM CHLORIDE, DEXTROSE MONOHYDRATE AND SODIUM CHLORIDE 125 ML/HR: 150; 5; 450 INJECTION, SOLUTION INTRAVENOUS at 20:53

## 2022-01-22 RX ADMIN — POTASSIUM CHLORIDE, DEXTROSE MONOHYDRATE AND SODIUM CHLORIDE 125 ML/HR: 150; 5; 450 INJECTION, SOLUTION INTRAVENOUS at 13:37

## 2022-01-22 RX ADMIN — ONDANSETRON 4 MG: 2 INJECTION INTRAMUSCULAR; INTRAVENOUS at 04:03

## 2022-01-22 RX ADMIN — POTASSIUM CHLORIDE, DEXTROSE MONOHYDRATE AND SODIUM CHLORIDE 125 ML/HR: 150; 5; 450 INJECTION, SOLUTION INTRAVENOUS at 02:58

## 2022-01-22 RX ADMIN — FAMOTIDINE 20 MG: 10 INJECTION, SOLUTION INTRAVENOUS at 08:20

## 2022-01-22 RX ADMIN — MAGNESIUM SULFATE HEPTAHYDRATE 1 G: 1 INJECTION, SOLUTION INTRAVENOUS at 11:07

## 2022-01-22 RX ADMIN — MAGNESIUM SULFATE HEPTAHYDRATE 1 G: 1 INJECTION, SOLUTION INTRAVENOUS at 08:20

## 2022-01-22 RX ADMIN — MAGNESIUM SULFATE HEPTAHYDRATE 1 G: 1 INJECTION, SOLUTION INTRAVENOUS at 11:00

## 2022-01-22 NOTE — PROGRESS NOTES
Patient up to bedside commode. No bowel movement. Only flattus. One time dose morphine administered for pain of 7/10 in abdomen.

## 2022-01-22 NOTE — PROGRESS NOTES
Progress Note  Date:2022       Room:[facility]  Patient Naina Vail     YOB: 1955     Age:66 y.o. Subjective    Subjective patient states she feels better. She denies any abdominal discomfort. She did pass flatus this morning. Review of Systems   Constitutional: Negative for fever. All other systems reviewed and are negative. Objective         Vitals Last 24 Hours:  TEMPERATURE:  Temp  Av.4 °F (36.3 °C)  Min: 96.9 °F (36.1 °C)  Max: 98.1 °F (36.7 °C)  RESPIRATIONS RANGE: Resp  Av  Min: 14  Max: 18  PULSE OXIMETRY RANGE: SpO2  Av.5 %  Min: 94 %  Max: 97 %  PULSE RANGE: Pulse  Av.3  Min: 60  Max: 68  BLOOD PRESSURE RANGE: Systolic (78PTU), AHQ:844 , Min:152 , DQA:542   ; Diastolic (04HLI), ZMI:56, Min:74, Max:80    I/O (24Hr): No intake or output data in the 24 hours ending 22 1220  Objective:  General Appearance:  Comfortable, well-appearing, in no acute distress and not in pain. Vital signs: (most recent): Blood pressure (!) 166/74, pulse 60, temperature 97.5 °F (36.4 °C), resp. rate 14, height 5' 2\" (1.575 m), weight 71.7 kg (158 lb), SpO2 95 %. No fever. Output: Producing urine. HEENT: Normal HEENT exam.    Lungs:  Normal effort. Heart: Normal rate. Abdomen: Abdomen is soft. There is no abdominal tenderness. Labs/Imaging/Diagnostics    Labs:  CBC:Recent Labs     22  0330 22  0530 22  0855   WBC 6.4 6.2 8.1   RBC 5.05 5.01 5.51*   HGB 13.0 12.9 14.3   HCT 39.2 38.7 43.0   MCV 77.6* 77.2* 78.0   RDW 14.0 13.9 14.1    211 252     CHEMISTRIES:  Recent Labs     22  0330 22  0530 22  0855   * 135 136   K 3.9 3.6 3.0*    104 97   CO2 21 24 24   BUN 7* 12 14   CA 8.9 8.9 9.5   PHOS 2.6  --   --    MG 1.6* 1.8 1.8   PT/INR:No results for input(s): INR, INREXT in the last 72 hours.     No lab exists for component: PROTIME  APTT:No results for input(s): APTT in the last 72 hours. LIVER PROFILE:  Recent Labs     01/22/22  0330 01/20/22  0855   AST 13* 16   ALT 9 15     Lab Results   Component Value Date/Time    ALT (SGPT) 9 01/22/2022 03:30 AM    AST (SGOT) 13 (L) 01/22/2022 03:30 AM    Alk. phosphatase 100 01/22/2022 03:30 AM    Bilirubin, direct 0.1 01/20/2022 08:55 AM    Bilirubin, total 0.4 01/22/2022 03:30 AM       Imaging Last 24 Hours:  XR ABD ACUTE W 1 V CHEST    Result Date: 1/22/2022  EXAM: ABDOMINAL RADIOGRAPHS WITH PA VIEW OF THE CHEST CLINICAL INDICATION/HISTORY: Small bowel obstruction. COMPARISON: 1/21/2022 TECHNIQUE: Supine and upright views of abdomen and PA view of the chest _______________ FINDINGS: HEART AND MEDIASTINUM: Normal cardiomediastinal silhouette. LUNGS AND PLEURAL SPACES: No suspicious opacities. BOWEL GAS PATTERN: Gastric tube tip in the stomach with side port slightly below the GE junction. Gas-filled mildly dilated segment of small bowel in the right abdomen similar to prior. There is otherwise limited bowel gas present. No visible free air. BONES: Unremarkable. OTHER: None. _______________     Similar appearance of dilated small bowel segment in the right abdomen. Gastric tube appears adequately positioned. Assessment//Plan   Principal Problem:    Small bowel obstruction ([address] (1/20/2022)    Active Problems:    Abnormal EKG (1/20/2022)      Bradycardia (1/20/2022)      Moderate protein-calorie malnutrition ([address] (1/21/2022)      Assessment & Plan 71-year-old female hospital day #2 for partial small bowel obstruction. Patient passed flatus this morning and is asymptomatic in regards to any abdominal complaints. Abdominal films show air throughout the colon which is increased from the day before. She continues to have air-filled small bowel loops in the right upper quadrant but this is decreased in diameter.   I have discussed with the patient and Dr. Wyvonna Buerger we will continue with our current treatment plan of nasogastric decompression, high IV hydration, maintenance of electrolytes balance, and repeat serial plain films in the a.m.   Electronically signed by Partha Parra MD on 2022 at 12:20 PM

## 2022-01-22 NOTE — PROGRESS NOTES
Advanced NG tube per verbal order from Dr. Yari Johnson, 6\" pt tolerated well, external length is 56cm, noted in flowsheets

## 2022-01-22 NOTE — PROGRESS NOTES
Progress Note  Date:1/22/2022       Room:[facility]  Patient Cachorro Parmar     YOB: 1955     Age:66 y.o. Cuauhtemoc Rodas is a 73 y.o. female who presents with past medical history for MI, hyperlipidemia, hypertension who presented to the emergency department with epigastric abdominal pain associate with nausea and vomiting for 5 days. She initially attributed it to seafood she ate on Sunday and then the discomfort persisted burning in nature radiating to her back. She subsequently had related shortness of breath and diaphoresis. Evaluated and found to have a bowel obstruction already seen by general surgery. Patient remains on intermittent suction making slow progress with a small amount of flatus produced this morning and very faint bowel sounds noted on exam.    Otherwise denies any other acute complaints or problems. Objective           Vitals Last 24 Hours:  Patient Vitals for the past 24 hrs:   Temp Pulse Resp BP SpO2   01/22/22 0416 97 °F (36.1 °C) 60 18 (!) 161/80 94 %   01/22/22 0400  60      01/22/22 0044 98.1 °F (36.7 °C) 60 18 (!) 165/75 96 %   01/22/22 0000  60      01/21/22 2025 96.9 °F (36.1 °C) 68 18 (!) 152/74 97 %   01/21/22 2000  68      01/21/22 1200  64      01/21/22 0830 97.4 °F (36.3 °C) 68 18 (!) 150/75 99 %   01/21/22 0800  71           Intake and Output:  Current Shift: No intake/output data recorded. Last three shifts:   01/20 1901 - 01/22 0700  In: 1593.3 [I.V.:1593.3]  Out: 650     Physical Exam:  General Appearance:  Comfortable, well-appearing. No acute distress. HEENT: NCAT, PERRL, No deformities or discharge, Neck supple with trachea midline. Respiratory: Normal respiratory rate. Breath sounds clear to auscultation. Heart: S1 normal and S2 normal.  No tachycardia  Abdomen: Soft and flat. Bowel sounds are normal. No rebound or guarding. No organomegaly. Extremities: Normal range of motion.   No acute deformities. No peripheral edema. Pulses: Distal pulses are intact. Neurological: Alert and oriented to person, place and time. Grossly intact. Skin:  Warm and dry. No acute lesions. Medications           Current Facility-Administered Medications   Medication Dose Route Frequency    ondansetron (ZOFRAN) injection 4 mg  4 mg IntraVENous Q4H PRN    levoFLOXacin (LEVAQUIN) 750 mg in D5W IVPB  750 mg IntraVENous Q24H    dextrose 5% - 0.45% NaCl with KCl 20 mEq/L infusion  125 mL/hr IntraVENous CONTINUOUS    famotidine (PF) (PEPCID) 20 mg in 0.9% sodium chloride 10 mL injection  20 mg IntraVENous Q12H    LORazepam (ATIVAN) injection 1 mg  1 mg IntraVENous Q6H PRN         Allergies         Penicillins       Labs/Imaging/Diagnostics      Labs:  Recent Results (from the past 24 hour(s))   CBC WITH AUTOMATED DIFF    Collection Time: 01/22/22  3:30 AM   Result Value Ref Range    WBC 6.4 4.6 - 13.2 K/uL    RBC 5.05 4.20 - 5.30 M/uL    HGB 13.0 12.0 - 16.0 g/dL    HCT 39.2 35.0 - 45.0 %    MCV 77.6 (L) 78.0 - 100.0 FL    MCH 25.7 24.0 - 34.0 PG    MCHC 33.2 31.0 - 37.0 g/dL    RDW 14.0 11.6 - 14.5 %    PLATELET 614 581 - 499 K/uL    MPV 10.9 9.2 - 11.8 FL    NRBC 0.0 0.0  WBC    ABSOLUTE NRBC 0.00 0.00 - 0.01 K/uL    NEUTROPHILS 68 40 - 73 %    LYMPHOCYTES 20 (L) 21 - 52 %    MONOCYTES 12 (H) 3 - 10 %    EOSINOPHILS 0 0 - 5 %    BASOPHILS 0 0 - 2 %    IMMATURE GRANULOCYTES 0 0 - 0.5 %    ABS. NEUTROPHILS 4.3 1.8 - 8.0 K/UL    ABS. LYMPHOCYTES 1.3 0.9 - 3.6 K/UL    ABS. MONOCYTES 0.7 0.05 - 1.2 K/UL    ABS. EOSINOPHILS 0.0 0.0 - 0.4 K/UL    ABS. BASOPHILS 0.0 0.0 - 0.1 K/UL    ABS. IMM.  GRANS. 0.0 0.00 - 0.04 K/UL    DF AUTOMATED     MAGNESIUM    Collection Time: 01/22/22  3:30 AM   Result Value Ref Range    Magnesium 1.6 (L) 1.7 - 2.8 mg/dL   PHOSPHORUS    Collection Time: 01/22/22  3:30 AM   Result Value Ref Range    Phosphorus 2.6 2.5 - 4.5 mg/dL        Trended key labs include:  Recent Labs 01/22/22  0330 01/21/22  0530 01/20/22  0855   WBC 6.4 6.2 8.1   HGB 13.0 12.9 14.3   HCT 39.2 38.7 43.0    211 252     Recent Labs     01/22/22  0330 01/21/22  0530 01/20/22  0855   NA  --  135 136   K  --  3.6 3.0*   CL  --  104 97   CO2  --  24 24   GLU  --  93 81   BUN  --  12 14   CREA  --  0.60* 0.80   CA  --  8.9 9.5   MG 1.6* 1.8 1.8   PHOS 2.6  --   --    ALB  --   --  3.9   TBILI  --   --  1.0   ALT  --   --  15       Imaging Last 24 Hours:  No results found. Assessment//Plan           Patient Active Problem List    Diagnosis Date Noted    Moderate protein-calorie malnutrition ([facility] 01/21/2022    Small bowel obstruction ([facility] 01/20/2022    Abnormal EKG 01/20/2022    Bradycardia 01/20/2022      Small bowel obstruction (HCC)  - Evaluated by general surgery  - NG to suction and n.p.o.  - Repeat abdominal study- reviewed by surgery  -Minimal flatus and faint bowel sounds we will hold n.p.o.     Abnormal EKG  -Rhythm: sinus bradycardia; and regular . Rate (approx.): 48; Axis: left axis deviation; DC interval: normal; QRS interval: prolonged; ST/T wave: T wave inverted; Other findings: QTc 438. Deeply inverted T waves in II; III; aVF.  No ST elevation noted in I or aVL. .  -Evaluated by cardiology  -We will maintain potassium and magnesium at 4 and 2 respectively     Bradycardia  -Evaluated by cardiology  -Pulse ranging between 48 and 59  -Blood pressure mildly elevated on admission  - Telemetry    Code status: Full   Prophylaxis: Lovenox SQ    Clinical time 25 minutes with >50% of visit spent in counseling and coordination of care      Electronically signed by Damian Luna, PhD, PA-C on 1/22/2022 at 7:29 AM

## 2022-01-23 ENCOUNTER — APPOINTMENT (OUTPATIENT)
Dept: GENERAL RADIOLOGY | Age: 67
DRG: 389 | End: 2022-01-23
Attending: SURGERY
Payer: COMMERCIAL

## 2022-01-23 LAB
ANION GAP SERPL CALC-SCNC: 6 MMOL/L
BASOPHILS # BLD: 0 K/UL (ref 0–0.1)
BASOPHILS NFR BLD: 0 % (ref 0–2)
BUN SERPL-MCNC: 3 MG/DL (ref 9–21)
BUN/CREAT SERPL: 4
CA-I BLD-MCNC: 8.7 MG/DL (ref 8.5–10.5)
CHLORIDE SERPL-SCNC: 104 MMOL/L (ref 94–111)
CO2 SERPL-SCNC: 24 MMOL/L (ref 21–33)
CREAT SERPL-MCNC: 0.8 MG/DL (ref 0.7–1.2)
DIFFERENTIAL METHOD BLD: NORMAL
EOSINOPHIL # BLD: 0 K/UL (ref 0–0.4)
EOSINOPHIL NFR BLD: 0 % (ref 0–5)
ERYTHROCYTE [DISTWIDTH] IN BLOOD BY AUTOMATED COUNT: 14.2 % (ref 11.6–14.5)
GLUCOSE SERPL-MCNC: 122 MG/DL (ref 70–110)
HCT VFR BLD AUTO: 39.8 % (ref 35–45)
HGB BLD-MCNC: 13.1 G/DL (ref 12–16)
IMM GRANULOCYTES # BLD AUTO: 0 K/UL (ref 0–0.04)
IMM GRANULOCYTES NFR BLD AUTO: 0 % (ref 0–0.5)
LYMPHOCYTES # BLD: 1.4 K/UL (ref 0.9–3.6)
LYMPHOCYTES NFR BLD: 22 % (ref 21–52)
MAGNESIUM SERPL-MCNC: 1.9 MG/DL (ref 1.7–2.8)
MCH RBC QN AUTO: 25.9 PG (ref 24–34)
MCHC RBC AUTO-ENTMCNC: 32.9 G/DL (ref 31–37)
MCV RBC AUTO: 78.7 FL (ref 78–100)
MONOCYTES # BLD: 0.7 K/UL (ref 0.05–1.2)
MONOCYTES NFR BLD: 10 % (ref 3–10)
NEUTS SEG # BLD: 4.4 K/UL (ref 1.8–8)
NEUTS SEG NFR BLD: 68 % (ref 40–73)
NRBC # BLD: 0 K/UL (ref 0–0.01)
NRBC BLD-RTO: 0 PER 100 WBC
PHOSPHATE SERPL-MCNC: 2.9 MG/DL (ref 2.5–4.5)
PLATELET # BLD AUTO: 196 K/UL (ref 135–420)
PMV BLD AUTO: 11 FL (ref 9.2–11.8)
POTASSIUM SERPL-SCNC: 3.8 MMOL/L (ref 3.2–5.1)
RBC # BLD AUTO: 5.06 M/UL (ref 4.2–5.3)
SODIUM SERPL-SCNC: 134 MMOL/L (ref 135–145)
WBC # BLD AUTO: 6.5 K/UL (ref 4.6–13.2)

## 2022-01-23 PROCEDURE — 74011250636 HC RX REV CODE- 250/636: Performed by: SURGERY

## 2022-01-23 PROCEDURE — 65270000029 HC RM PRIVATE

## 2022-01-23 PROCEDURE — 80048 BASIC METABOLIC PNL TOTAL CA: CPT

## 2022-01-23 PROCEDURE — 74011250637 HC RX REV CODE- 250/637: Performed by: INTERNAL MEDICINE

## 2022-01-23 PROCEDURE — 83735 ASSAY OF MAGNESIUM: CPT

## 2022-01-23 PROCEDURE — 84100 ASSAY OF PHOSPHORUS: CPT

## 2022-01-23 PROCEDURE — 74022 RADEX COMPL AQT ABD SERIES: CPT

## 2022-01-23 PROCEDURE — 85025 COMPLETE CBC W/AUTO DIFF WBC: CPT

## 2022-01-23 PROCEDURE — 36415 COLL VENOUS BLD VENIPUNCTURE: CPT

## 2022-01-23 PROCEDURE — 74011000250 HC RX REV CODE- 250: Performed by: PHYSICIAN ASSISTANT

## 2022-01-23 PROCEDURE — 74011250636 HC RX REV CODE- 250/636: Performed by: PHYSICIAN ASSISTANT

## 2022-01-23 RX ORDER — ACETAMINOPHEN 325 MG/1
650 TABLET ORAL
Status: DISCONTINUED | OUTPATIENT
Start: 2022-01-23 | End: 2022-01-30 | Stop reason: HOSPADM

## 2022-01-23 RX ADMIN — ONDANSETRON 4 MG: 2 INJECTION INTRAMUSCULAR; INTRAVENOUS at 16:00

## 2022-01-23 RX ADMIN — POTASSIUM CHLORIDE, DEXTROSE MONOHYDRATE AND SODIUM CHLORIDE 125 ML/HR: 150; 5; 450 INJECTION, SOLUTION INTRAVENOUS at 16:07

## 2022-01-23 RX ADMIN — ACETAMINOPHEN 650 MG: 325 TABLET ORAL at 09:54

## 2022-01-23 RX ADMIN — FAMOTIDINE 20 MG: 10 INJECTION, SOLUTION INTRAVENOUS at 22:34

## 2022-01-23 RX ADMIN — LEVOFLOXACIN 750 MG: 5 INJECTION, SOLUTION INTRAVENOUS at 09:52

## 2022-01-23 RX ADMIN — POTASSIUM CHLORIDE, DEXTROSE MONOHYDRATE AND SODIUM CHLORIDE 125 ML/HR: 150; 5; 450 INJECTION, SOLUTION INTRAVENOUS at 05:10

## 2022-01-23 RX ADMIN — FAMOTIDINE 20 MG: 10 INJECTION, SOLUTION INTRAVENOUS at 09:54

## 2022-01-23 NOTE — PROGRESS NOTES
Suction did not appear to be working anymore, Flushed pt's NG tube with 70mL of water, removed 70mL with syringe, suction returned to normal. Pt tolerated well.

## 2022-01-23 NOTE — PROGRESS NOTES
Hospitalist Progress Note     INTERNAL MEDICINE PROGRESS NOTE  Patient: Man Acosta   YOB: 1955   MRN: [id number]      Hospital course / Assessment    Principle Problems:  Small bowel obstruction (HCC)  - NPO,  NG to suction, improved, passing gas, management per surgery    Abnormal EKG / Bradycardia  -Rhythm: sinus bradycardia; and regular . Rate (approx.): 48; Axis: left axis deviation; VA interval: normal; QRS interval: prolonged; ST/T wave: T wave inverted; Other findings: QTc 438. Deeply inverted T waves in II; III; aVF.  No ST elevation noted in I or aVL. .  -Cardiology consulted   -We will maintain potassium and magnesium at 4 and 2 respectively  - Avoid BB, CCB , no event on tele, Lytes normal     Code Status: Full code   DVT prophylaxis: pharmacologic and mechanical  Today Recommendation and Plan:   management per surgery   Cardiology to see the patient   Cont' with tele monitor, avoid BB/CCB, cont' with Lytes monitor , cont' with NPO,NGT and IVF support  Avoid Narcotic  Ambulation  Home when ok with surgery and cardiology       Disposition    Disposition: home     Subjective / ROS:   Patient states no CP, no SOB,  Still having Abd pain, passing gas       Medical Decision Making   Chart, Images and Lab data reviewed, necessary medical Orders placed   Discussed with nursing staff     Vitals:    22 1947 22 0045 22 0059 22 0430   BP: (!) 148/83 (!) 145/76  (!) 158/87   Pulse: 68 70  75   Resp: 16 16  18   Temp: 97.4 °F (36.3 °C) 97.3 °F (36.3 °C)  96.9 °F (36.1 °C)   SpO2: 95% 97%  100%   Weight:   71.4 kg (157 lb 8 oz)    Height:         Temp (24hrs), Av.3 °F (36.3 °C), Min:96.9 °F (36.1 °C), Max:97.5 °F (36.4 °C)      Intake/Output Summary (Last 24 hours) at 2022 0756  Last data filed at 2022 0431  Gross per 24 hour   Intake --   Output 2075 ml   Net -2075 ml       Physical Exam:   General Appearance:   Appears in no acute distress.,  HEENT:   Moist oral mucous membranes, conjunctiva clear,   Neck:   Supple  Lungs: No wheezes. , No rales. , Normal respiratory effort,   Heart:   Regular rate and rhythm  Abdomen:   Soft , Non-distended and Non-tender,   Extremities:   no edema of legs  Neuro:   alert, oriented, moves all extremities well    Current medications:     Current Facility-Administered Medications   Medication Dose Route Frequency    ondansetron (ZOFRAN) injection 4 mg  4 mg IntraVENous Q4H PRN    levoFLOXacin (LEVAQUIN) 750 mg in D5W IVPB  750 mg IntraVENous Q24H    dextrose 5% - 0.45% NaCl with KCl 20 mEq/L infusion  125 mL/hr IntraVENous CONTINUOUS    famotidine (PF) (PEPCID) 20 mg in 0.9% sodium chloride 10 mL injection  20 mg IntraVENous Q12H    LORazepam (ATIVAN) injection 1 mg  1 mg IntraVENous Q6H PRN          Laboratory and Radiology Data :      No results found for: FERR  WBC   Date Value Ref Range Status   01/23/2022 6.5 4.6 - 13.2 K/uL Final     No results found for: MARK  No results found for: UEO    Microbiology    No results found for: GMS    Recent Results (from the past 24 hour(s))   METABOLIC PANEL, BASIC    Collection Time: 01/23/22  2:30 AM   Result Value Ref Range    Sodium 134 (L) 135 - 145 mmol/L    Potassium 3.8 3.2 - 5.1 mmol/L    Chloride 104 94 - 111 mmol/L    CO2 24 21 - 33 mmol/L    Anion gap 6 mmol/L    Glucose 122 (H) 70 - 110 mg/dL    BUN 3 (L) 9 - 21 mg/dL    Creatinine 0.80 0.70 - 1.20 mg/dL    BUN/Creatinine ratio 4      GFR est AA >60 ml/min/1.73m2    GFR est non-AA >60 ml/min/1.73m2    Calcium 8.7 8.5 - 10.5 mg/dL   PHOSPHORUS    Collection Time: 01/23/22  2:30 AM   Result Value Ref Range    Phosphorus 2.9 2.5 - 4.5 mg/dL   CBC WITH AUTOMATED DIFF    Collection Time: 01/23/22  2:30 AM   Result Value Ref Range    WBC 6.5 4.6 - 13.2 K/uL    RBC 5.06 4.20 - 5.30 M/uL    HGB 13.1 12.0 - 16.0 g/dL    HCT 39.8 35.0 - 45.0 %    MCV 78.7 78.0 - 100.0 FL    MCH 25.9 24.0 - 34.0 PG    MCHC 32.9 31.0 - 37.0 g/dL    RDW 14.2 11.6 - 14.5 %    PLATELET 494 022 - 879 K/uL    MPV 11.0 9.2 - 11.8 FL    NRBC 0.0 0.0  WBC    ABSOLUTE NRBC 0.00 0.00 - 0.01 K/uL    NEUTROPHILS 68 40 - 73 %    LYMPHOCYTES 22 21 - 52 %    MONOCYTES 10 3 - 10 %    EOSINOPHILS 0 0 - 5 %    BASOPHILS 0 0 - 2 %    IMMATURE GRANULOCYTES 0 0 - 0.5 %    ABS. NEUTROPHILS 4.4 1.8 - 8.0 K/UL    ABS. LYMPHOCYTES 1.4 0.9 - 3.6 K/UL    ABS. MONOCYTES 0.7 0.05 - 1.2 K/UL    ABS. EOSINOPHILS 0.0 0.0 - 0.4 K/UL    ABS. BASOPHILS 0.0 0.0 - 0.1 K/UL    ABS. IMM. GRANS. 0.0 0.00 - 0.04 K/UL    DF AUTOMATED     MAGNESIUM    Collection Time: 01/23/22  2:30 AM   Result Value Ref Range    Magnesium 1.9 1.7 - 2.8 mg/dL       XR Results:  Results from Hospital Encounter encounter on 01/20/22    XR ABD (KUB)    Narrative  EXAM: KUB    INDICATION: NG tube placement    COMPARISON: Same date 7:34 AM    _______________    FINDINGS:    There is an orogastric sump pump tube with tip below the diaphragm with tip  overlying the stomach. Heart and mediastinal contours are normal. There is  blunting of the right costo phrenic angle suggesting small effusion. Dilated  small bowel loops are seen suggesting ileus or obstruction. _______________    Impression  Orogastric sump pump tube with tip below the diaphragm and tip overlies the  stomach. Dilated small bowel loops seen suggesting ileus or obstruction. Blunting of the right breast phrenic angle suggesting small effusion. CT Results:  Results from [place] encounter on 01/20/22    CT ABD PELV WO CONT    Narrative  EXAM: CT of the abdomen and pelvis    CLINICAL INDICATION/HISTORY: epigastric pain and vomiting  > Additional: None. COMPARISON: CT abdomen pelvis with contrast 02/06/2017  > Reference Exam: None. TECHNIQUE: Axial CT imaging of the abdomen and pelvis was performed without  intravenous contrast. Oral contrast not administered. Multiplanar reformats  were generated.  Dose reduction techniques used: automated exposure control,  adjustment of the mAs and/or kVp according to patient size, and iterative  reconstruction techniques. Digital Imaging and Communications in Medicine  (DICOM) format image data are available to nonaffiliated external healthcare  facilities or entities on a secure, media free, reciprocally searchable basis  with patient authorization for at least a 12-month period after this study. _______________    FINDINGS:    LOWER CHEST: Coronary atherosclerosis. Lung bases are clear. LIVER, BILIARY: Liver is unremarkable. No biliary dilation. Dependent gallstone  without gallbladder distention or adjacent inflammatory changes. PANCREAS: Unremarkable. SPLEEN: Unremarkable. ADRENALS: Unremarkable. KIDNEYS/URETERS/BLADDER: No hydronephrosis. No calculi. Upper pole low density  lesion measuring 8 mm, very likely benign such as cyst. No follow-up imaging is  recommended as incidental lesions are likely benign. LYMPH NODES: No enlarged lymph nodes. GASTROINTESTINAL TRACT: Long segment of mild small bowel dilatation (up to 3.5  cm diameter) extending from the mid small bowel to the distal small bowel. There  is decompressed proximal and distal small bowel. Distal transition appears to be  within the anterior abdomen (axial image 86; sagittal 54), with no mass, likely  secondary to adhesions. No colonic dilatation. Colonic diverticulosis. PELVIC ORGANS: Hysterectomy. VASCULATURE: Mild to moderate aortoiliac atherosclerosis. BONES: No acute or aggressive osseous abnormalities identified. Moderate lower  lumbar degenerative disc disease. OTHER: No ascites. [place] fat containing umbilical hernia with neck measuring  20 mm.    _______________    Impression  1. Distal mild small bowel obstruction. Transition appears to be within the  anterior abdomen, likely from adhesions.  Proximal small bowel also is  decompressed, raising the possibility of closed loop obstruction. 2. Cholelithiasis. 3. [place] fat containing umbilical hernia. MRI Results:  No results found for this or any previous visit. Nuclear Medicine Results:  No results found for this or any previous visit. US Results:  No results found for this or any previous visit. IR Results:  No results found for this or any previous visit. VAS/US Results:  No results found for this or any previous visit. Nicholas Loyd M.D.   Hospitalist

## 2022-01-23 NOTE — PROGRESS NOTES
Progress Note  Date:2022       Room:[facility]  Patient Hope Reddy     YOB: 1955     Age:66 y.o. Subjective    Subjective patient denies any current abdominal complaints. She states she did pass a minimal amount of flatus this AM.  Review of Systems   Constitutional: Negative for fever. All other systems reviewed and are negative. Objective         Vitals Last 24 Hours:  TEMPERATURE:  Temp  Av.1 °F (36.2 °C)  Min: 96.9 °F (36.1 °C)  Max: 97.4 °F (36.3 °C)  RESPIRATIONS RANGE: Resp  Av.8  Min: 16  Max: 18  PULSE OXIMETRY RANGE: SpO2  Av.5 %  Min: 95 %  Max: 100 %  PULSE RANGE: Pulse  Av.3  Min: 64  Max: 75  BLOOD PRESSURE RANGE: Systolic (91AHR), YZI:308 , Min:145 , WPW:338   ; Diastolic (95XHE), VPD:41, Min:76, Max:87    I/O (24Hr): Intake/Output Summary (Last 24 hours) at 2022 1240  Last data filed at 2022 0950  Gross per 24 hour   Intake --   Output 2375 ml   Net -2375 ml     Objective:  General Appearance:  Comfortable and in no acute distress. Vital signs: (most recent): Blood pressure (!) 165/84, pulse 64, temperature 96.9 °F (36.1 °C), resp. rate 17, height 5' 2\" (1.575 m), weight 71.4 kg (157 lb 8 oz), SpO2 98 %. Vital signs are normal.  No fever. Output: Producing urine. HEENT: Normal HEENT exam.    Lungs:  Normal effort. Heart: Normal rate. Abdomen: Abdomen is soft. There is no abdominal tenderness.        Labs/Imaging/Diagnostics    Labs:  CBC:  Recent Labs     22  0230 22  0330 22  0530   WBC 6.5 6.4 6.2   RBC 5.06 5.05 5.01   HGB 13.1 13.0 12.9   HCT 39.8 39.2 38.7   MCV 78.7 77.6* 77.2*   RDW 14.2 14.0 13.9    190 211     CHEMISTRIES:  Recent Labs     22  0230 22  0330 22  0530   * 134* 135   K 3.8 3.9 3.6    102 104   CO2 24 21 24   BUN 3* 7* 12   CA 8.7 8.9 8.9   PHOS 2.9 2.6  --    MG 1.9 1.6* 1.8   PT/INR:No results for input(s): INR, INREXT in the last 72 hours. No lab exists for component: PROTIME  APTT:No results for input(s): APTT in the last 72 hours. LIVER PROFILE:  Recent Labs     01/22/22  0330   AST 13*   ALT 9     Lab Results   Component Value Date/Time    ALT (SGPT) 9 01/22/2022 03:30 AM    AST (SGOT) 13 (L) 01/22/2022 03:30 AM    Alk. phosphatase 100 01/22/2022 03:30 AM    Bilirubin, direct 0.1 01/20/2022 08:55 AM    Bilirubin, total 0.4 01/22/2022 03:30 AM       Imaging Last 24 Hours:  XR ABD (KUB)    Result Date: 1/22/2022  EXAM: KUB INDICATION: NG tube placement COMPARISON: Same date 7:34 AM _______________ FINDINGS: There is an orogastric sump pump tube with tip below the diaphragm with tip overlying the stomach. Heart and mediastinal contours are normal. There is blunting of the right costo phrenic angle suggesting small effusion. Dilated small bowel loops are seen suggesting ileus or obstruction. _______________     Orogastric sump pump tube with tip below the diaphragm and tip overlies the stomach. Dilated small bowel loops seen suggesting ileus or obstruction. Blunting of the right breast phrenic angle suggesting small effusion. XR ABD ACUTE W 1 V CHEST    Result Date: 1/23/2022  EXAM: ABDOMINAL RADIOGRAPHS WITH PA VIEW OF THE CHEST CLINICAL INDICATION/HISTORY: Small bowel obstruction follow-up COMPARISON: 1/22/2022 TECHNIQUE: Supine and upright views of abdomen and PA view of the chest _______________ FINDINGS: HEART AND MEDIASTINUM: Unremarkable. LUNGS AND PLEURAL SPACES: Unremarkable. BOWEL GAS PATTERN: Nasogastric tube appears adequately positioned. Gas-filled dilated segments of small bowel in central abdomen and left upper quadrant. Increased visualization of these bowel segments which were mostly fluid-filled on prior, diameter of dilation similar to the abnormal segment seen on prior exam. No evidence of free air. BONES: Unremarkable.  OTHER: None. _______________     Persistent gas-filled dilated small bowel. Nasogastric tube in place. Assessment//Plan   Principal Problem:    Small bowel obstruction ([address] (2022)    Active Problems:    Abnormal EKG (2022)      Bradycardia (2022)      Moderate protein-calorie malnutrition ([address] (2022)      Assessment & Plan 69-year-old female hospital day #3 for possible partial small bowel obstruction secondary to adhesions from previous hysterectomy. Patient has reported minimal amount of flatus this a.m. is currently asymptomatic related to any abdominal complaints. Patient's nasogastric tube was nonfunctional this a.m. it was on high suction and the sump port was blocked with the filter. Corrections were made to the settings to medium intermittent suction and the catheter was confirmed to be cleared both through the suction port and the sump port and functional.  I have reviewed with the nurse at the bedside appropriate maintenance of the nasogastric tube. Abdominal films this morning demonstrated a gastric bubble with evidence of some new loops of small bowel containing air most consistent with a nonfunctional nasogastric tube. There was evidence of air down in the rectal vault. Given the patient's overall clinical picture we will continue with continued appropriate functioning nasogastric suction, adequate hydration, electrolyte maintenance, and repeat her abdominal films in the a.m. I have discussed with the patient if she does not pass any more flatus and the abdominal films are not normalized in the a.m. then Dr. Rosana Canchola may anticipate exploratory laparotomy and all indicated procedures. Patient understands and agrees the treatment plan and possible surgical options. I have also discussed this with the hospitalist who is in agreement.     Electronically signed by Partha Parra MD on 2022 at 12:40 PM

## 2022-01-24 ENCOUNTER — APPOINTMENT (OUTPATIENT)
Dept: GENERAL RADIOLOGY | Age: 67
DRG: 389 | End: 2022-01-24
Attending: SURGERY
Payer: COMMERCIAL

## 2022-01-24 LAB
ANION GAP SERPL CALC-SCNC: 6 MMOL/L
BUN SERPL-MCNC: 3 MG/DL (ref 9–21)
BUN/CREAT SERPL: 4
CA-I BLD-MCNC: 9.1 MG/DL (ref 8.5–10.5)
CHLORIDE SERPL-SCNC: 103 MMOL/L (ref 94–111)
CO2 SERPL-SCNC: 25 MMOL/L (ref 21–33)
CREAT SERPL-MCNC: 0.7 MG/DL (ref 0.7–1.2)
ERYTHROCYTE [DISTWIDTH] IN BLOOD BY AUTOMATED COUNT: 14.3 % (ref 11.6–14.5)
GLUCOSE SERPL-MCNC: 110 MG/DL (ref 70–110)
HCT VFR BLD AUTO: 39 % (ref 35–45)
HGB BLD-MCNC: 12.9 G/DL (ref 12–16)
MAGNESIUM SERPL-MCNC: 1.6 MG/DL (ref 1.7–2.8)
MCH RBC QN AUTO: 25.9 PG (ref 24–34)
MCHC RBC AUTO-ENTMCNC: 33.1 G/DL (ref 31–37)
MCV RBC AUTO: 78.3 FL (ref 78–100)
NRBC # BLD: 0 K/UL (ref 0–0.01)
NRBC BLD-RTO: 0 PER 100 WBC
PLATELET # BLD AUTO: 194 K/UL (ref 135–420)
PMV BLD AUTO: 11.3 FL (ref 9.2–11.8)
POTASSIUM SERPL-SCNC: 4.2 MMOL/L (ref 3.2–5.1)
RBC # BLD AUTO: 4.98 M/UL (ref 4.2–5.3)
SODIUM SERPL-SCNC: 134 MMOL/L (ref 135–145)
WBC # BLD AUTO: 8.4 K/UL (ref 4.6–13.2)

## 2022-01-24 PROCEDURE — 74011250636 HC RX REV CODE- 250/636: Performed by: PHYSICIAN ASSISTANT

## 2022-01-24 PROCEDURE — 83735 ASSAY OF MAGNESIUM: CPT

## 2022-01-24 PROCEDURE — 74022 RADEX COMPL AQT ABD SERIES: CPT

## 2022-01-24 PROCEDURE — 74011250637 HC RX REV CODE- 250/637: Performed by: INTERNAL MEDICINE

## 2022-01-24 PROCEDURE — 80048 BASIC METABOLIC PNL TOTAL CA: CPT

## 2022-01-24 PROCEDURE — 36415 COLL VENOUS BLD VENIPUNCTURE: CPT

## 2022-01-24 PROCEDURE — 74011250636 HC RX REV CODE- 250/636: Performed by: INTERNAL MEDICINE

## 2022-01-24 PROCEDURE — 74011000250 HC RX REV CODE- 250: Performed by: PHYSICIAN ASSISTANT

## 2022-01-24 PROCEDURE — 65270000029 HC RM PRIVATE

## 2022-01-24 PROCEDURE — 74011250636 HC RX REV CODE- 250/636: Performed by: SURGERY

## 2022-01-24 PROCEDURE — 85027 COMPLETE CBC AUTOMATED: CPT

## 2022-01-24 RX ORDER — ASPIRIN 300 MG/1
300 SUPPOSITORY RECTAL DAILY
Status: DISCONTINUED | OUTPATIENT
Start: 2022-01-24 | End: 2022-01-25

## 2022-01-24 RX ORDER — MAGNESIUM SULFATE HEPTAHYDRATE 40 MG/ML
2 INJECTION, SOLUTION INTRAVENOUS ONCE
Status: COMPLETED | OUTPATIENT
Start: 2022-01-24 | End: 2022-01-24

## 2022-01-24 RX ADMIN — ASPIRIN 300 MG: 300 SUPPOSITORY RECTAL at 09:41

## 2022-01-24 RX ADMIN — POTASSIUM CHLORIDE, DEXTROSE MONOHYDRATE AND SODIUM CHLORIDE 125 ML/HR: 150; 5; 450 INJECTION, SOLUTION INTRAVENOUS at 11:04

## 2022-01-24 RX ADMIN — POTASSIUM CHLORIDE, DEXTROSE MONOHYDRATE AND SODIUM CHLORIDE 125 ML/HR: 150; 5; 450 INJECTION, SOLUTION INTRAVENOUS at 19:52

## 2022-01-24 RX ADMIN — MAGNESIUM SULFATE HEPTAHYDRATE 2 G: 40 INJECTION, SOLUTION INTRAVENOUS at 09:41

## 2022-01-24 RX ADMIN — FAMOTIDINE 20 MG: 10 INJECTION, SOLUTION INTRAVENOUS at 09:41

## 2022-01-24 RX ADMIN — FAMOTIDINE 20 MG: 10 INJECTION, SOLUTION INTRAVENOUS at 21:41

## 2022-01-24 RX ADMIN — POTASSIUM CHLORIDE, DEXTROSE MONOHYDRATE AND SODIUM CHLORIDE 125 ML/HR: 150; 5; 450 INJECTION, SOLUTION INTRAVENOUS at 02:07

## 2022-01-24 NOTE — PROGRESS NOTES
Subjective:    Had nausea and emesis last p.m when NG wasn't functioning. Denies abdominal pain. Continues to pass flatus and had a small stool last p.m. Today's abdominal xrays pending. Objective: In no distress. NG in place and functioning. Abdomen is soft, not distended, B.S. are normoactive. Not tender. Abdominal xray pending. Assessment/recommendations: Will review X ray. Patient is clinically improved. Will make further recommendations after Xray reviewed.

## 2022-01-24 NOTE — PROGRESS NOTES
Patient medications administered. Suction did not appear to be working. Flushed NG tube with 30ml of water. Removed 30mL, suction returned to normal. Patient tolerated well. Suction now moving gastric content.

## 2022-01-24 NOTE — PROGRESS NOTES
Comprehensive Nutrition Assessment    Type and Reason for Visit: reassessment    Nutrition Recommendations/Plan: NPO   Once SBO resolved recommend clear liquids diet with ensure clear TID advance as tolerated to a cardiac diet and can stop ensure clear then. Nutrition Assessment:  72 yo female PMH: MI, HLD, HTN   Overweight BMI 28.9. Pt with hx of N/V x 5 days originally attributed to eating seafood on Sunday, but now comes in revealing has partial SBO 2/2 adhesions. per GI continue NPO with NGT for suction. Recommend once pt starts clear liquids diet provide ensure clear TID until diet can be advanced  K+ 3.0 on admission improved to 3.6 with IV K+    1/24/2022: NPO day 4 pt with 2 BM awaiting surgery recommendations on when to start diet. Surgery waiting for x ray results to make determination. Recent Results (from the past 24 hour(s))   CBC W/O DIFF    Collection Time: 01/24/22  3:53 AM   Result Value Ref Range    WBC 8.4 4.6 - 13.2 K/uL    RBC 4.98 4.20 - 5.30 M/uL    HGB 12.9 12.0 - 16.0 g/dL    HCT 39.0 35.0 - 45.0 %    MCV 78.3 78.0 - 100.0 FL    MCH 25.9 24.0 - 34.0 PG    MCHC 33.1 31.0 - 37.0 g/dL    RDW 14.3 11.6 - 14.5 %    PLATELET 418 448 - 924 K/uL    MPV 11.3 9.2 - 11.8 FL    NRBC 0.0 0.0  WBC    ABSOLUTE NRBC 0.00 0.00 - 6.32 K/uL   METABOLIC PANEL, BASIC    Collection Time: 01/24/22  3:53 AM   Result Value Ref Range    Sodium 134 (L) 135 - 145 mmol/L    Potassium 4.2 3.2 - 5.1 mmol/L    Chloride 103 94 - 111 mmol/L    CO2 25 21 - 33 mmol/L    Anion gap 6 mmol/L    Glucose 110 70 - 110 mg/dL    BUN 3 (L) 9 - 21 mg/dL    Creatinine 0.70 0.70 - 1.20 mg/dL    BUN/Creatinine ratio 4      GFR est AA >60 ml/min/1.73m2    GFR est non-AA >60 ml/min/1.73m2    Calcium 9.1 8.5 - 10.5 mg/dL   MAGNESIUM    Collection Time: 01/24/22  3:53 AM   Result Value Ref Range    Magnesium 1.6 (L) 1.7 - 2.8 mg/dL       Malnutrition Assessment:  Malnutrition Status:   At risk for malnutrition (specify) (NPO for SBO)    Context:  Acute illness     Findings of the 6 clinical characteristics of malnutrition:   Energy Intake:  Mild decrease in energy intake (specify) (NPO x 4 days)  Weight Loss:  Unable to assess     Body Fat Loss:  No significant body fat loss,     Muscle Mass Loss:  No significant muscle mass loss,    Fluid Accumulation:  No significant fluid accumulation,     Strength:  Normal  strength         Estimated Daily Nutrient Needs:  Energy (kcal): 4011-7214 kcal/day; Weight Used for Energy Requirements: Admission (72 kg)  Protein (g): 58 g/day; Weight Used for Protein Requirements: Admission (0.8-1 g/kg)  Fluid (ml/day): 1313-4236 mL/day; Method Used for Fluid Requirements: 1 ml/kcal      Nutrition Related Findings:  Overweight BMI 28.9. Pt with hx of N/V x 5 days originally attributed to eating seafood on Sunday, but now comes in revealing has partial SBO 2/2 adhesions. per GI continue NPO with NGT for suction. Wounds:    None       Current Nutrition Therapies:  No diet orders on file    Anthropometric Measures:  · Height:  5' 2\" (157.5 cm)  · Current Body Wt:  71.7 kg (158 lb)   · Admission Body Wt:  158 lb    · Usual Body Wt:        · Ideal Body Wt:  110 lbs:  143.6 %   · Adjusted Body Weight:   ; Weight Adjustment for: No adjustment   · Adjusted BMI:       · BMI Category: Overweight (BMI 25.0-29. 9)       Nutrition Diagnosis:   · Predicted inadequate energy intake related to altered GI function,altered GI structure as evidenced by vomiting,nausea,NPO or clear liquid status due to medical condition      Nutrition Interventions:   Food and/or Nutrient Delivery: Continue NPO  Nutrition Education and Counseling: Education not appropriate  Coordination of Nutrition Care: Continue to monitor while inpatient    Goals:  K+ 3.5-5.1, pt to eat greater than 75% of meals once diet starts, BM every 1-3 days       Nutrition Monitoring and Evaluation:   Behavioral-Environmental Outcomes:    Food/Nutrient Intake Outcomes: Food and nutrient intake,Diet advancement/tolerance  Physical Signs/Symptoms Outcomes: Biochemical data,Meal time behavior,Weight     F/u: 1/26/2022    Discharge Planning:     Too soon to determine     Electronically signed by Tony Roca on 1/24/2022 at 3:21 PM    Contact: YAMIL [phone]

## 2022-01-24 NOTE — PROGRESS NOTES
X ray reviewed---essentially no change from yesterday    Patient continues to pass flatus and had what she describes as a \"normaal\" stool again today after her Xrays. Exam remains benign. Clinically improving, X ray lagging behind. Will continue as now.

## 2022-01-24 NOTE — PROGRESS NOTES
Hospitalist Progress Note     INTERNAL MEDICINE PROGRESS NOTE  Patient: Yari Randolph   YOB: 1955   MRN: [id number]      Hospital course / Assessment    Principle Problems:  Small bowel obstruction ([address]  - may due to adhesions  - NPO,  NG to suction,Pepcid,  passing gas, management per surgery    - KUB:  Persistent gas-filled dilated small bowel  - Had BM x2 , passing gas   Abnormal EKG / Bradycardia  -Rhythm: sinus bradycardia; and regular, Rate 48;  ST/T wave: T wave inverted; Other findings: QTc 438. Deeply inverted T waves in II; III; aVF.  No ST elevation noted in I or aVL. .  - Avoid BB, CCB , no event on tele, Lytes normal , HR improved 80's, cardiology consulted, ASA rectal   Hypomagnesemia   - replace and cont' monitor lytes   Hyperlipidemia   - Statin on hold due to SBO  CAD  - ASA, Coreg and Lipitor on hold due to SBO      Code Status: Full code   DVT prophylaxis: pharmacologic and mechanical  Today Recommendation and Plan:   management per surgery , NGT   Correct mag   cont' with Lytes monitor   May switch ASA to [facility] [employer] when ok with surgery       Disposition    Disposition: home     Subjective / ROS:   Patient states Still having Abd pain, had 2 BM,        Medical Decision Making   Chart, Images and Lab data reviewed, necessary medical Orders placed   Discussed with nursing staff     Vitals:    22 1706 22 2149 22 0131 22 0439   BP: (!) 172/76 129/72 126/87 124/81   Pulse: 62 60 79 81   Resp: 17 16 16 16   Temp: 97.4 °F (36.3 °C) 96.9 °F (36.1 °C) 97 °F (36.1 °C) 96.9 °F (36.1 °C)   SpO2: 98% 98% 98% 98%   Weight:       Height:         Temp (24hrs), Av.1 °F (36.2 °C), Min:96.9 °F (36.1 °C), Max:97.4 °F (36.3 °C)      Intake/Output Summary (Last 24 hours) at 2022 1831  Last data filed at 2022 1931  Gross per 24 hour   Intake --   Output 550 ml   Net -550 ml       Physical Exam:   General Appearance:   Appears in no acute distress.,  HEENT:   Moist oral mucous membranes, conjunctiva clear, NGT   Neck:   Supple  Lungs: No wheezes. , No rales. , Normal respiratory effort,   Heart:   Regular rate and rhythm  Abdomen:   Soft , Non-distended and tender,   Extremities:   no edema of legs  Neuro:   alert, oriented, moves all extremities well    Current medications:     Current Facility-Administered Medications   Medication Dose Route Frequency    acetaminophen (TYLENOL) tablet 650 mg  650 mg Oral Q4H PRN    ondansetron (ZOFRAN) injection 4 mg  4 mg IntraVENous Q4H PRN    levoFLOXacin (LEVAQUIN) 750 mg in D5W IVPB  750 mg IntraVENous Q24H    dextrose 5% - 0.45% NaCl with KCl 20 mEq/L infusion  125 mL/hr IntraVENous CONTINUOUS    famotidine (PF) (PEPCID) 20 mg in 0.9% sodium chloride 10 mL injection  20 mg IntraVENous Q12H    LORazepam (ATIVAN) injection 1 mg  1 mg IntraVENous Q6H PRN          Laboratory and Radiology Data :      No results found for: FERR  WBC   Date Value Ref Range Status   01/24/2022 8.4 4.6 - 13.2 K/uL Final     No results found for: MARK  No results found for: UEO    Microbiology    No results found for: GMS    Recent Results (from the past 24 hour(s))   CBC W/O DIFF    Collection Time: 01/24/22  3:53 AM   Result Value Ref Range    WBC 8.4 4.6 - 13.2 K/uL    RBC 4.98 4.20 - 5.30 M/uL    HGB 12.9 12.0 - 16.0 g/dL    HCT 39.0 35.0 - 45.0 %    MCV 78.3 78.0 - 100.0 FL    MCH 25.9 24.0 - 34.0 PG    MCHC 33.1 31.0 - 37.0 g/dL    RDW 14.3 11.6 - 14.5 %    PLATELET 704 012 - 790 K/uL    MPV 11.3 9.2 - 11.8 FL    NRBC 0.0 0.0  WBC    ABSOLUTE NRBC 0.00 0.00 - 2.41 K/uL   METABOLIC PANEL, BASIC    Collection Time: 01/24/22  3:53 AM   Result Value Ref Range    Sodium 134 (L) 135 - 145 mmol/L    Potassium 4.2 3.2 - 5.1 mmol/L    Chloride 103 94 - 111 mmol/L    CO2 25 21 - 33 mmol/L    Anion gap 6 mmol/L    Glucose 110 70 - 110 mg/dL    BUN 3 (L) 9 - 21 mg/dL    Creatinine 0.70 0.70 - 1.20 mg/dL    BUN/Creatinine ratio 4      GFR est AA >60 ml/min/1.73m2    GFR est non-AA >60 ml/min/1.73m2    Calcium 9.1 8.5 - 10.5 mg/dL   MAGNESIUM    Collection Time: 01/24/22  3:53 AM   Result Value Ref Range    Magnesium 1.6 (L) 1.7 - 2.8 mg/dL       XR Results:  Results from Hospital Encounter encounter on 01/20/22    XR ABD (KUB)    Narrative  EXAM: KUB    INDICATION: NG tube placement    COMPARISON: Same date 7:34 AM    _______________    FINDINGS:    There is an orogastric sump pump tube with tip below the diaphragm with tip  overlying the stomach. Heart and mediastinal contours are normal. There is  blunting of the right costo phrenic angle suggesting small effusion. Dilated  small bowel loops are seen suggesting ileus or obstruction. _______________    Impression  Orogastric sump pump tube with tip below the diaphragm and tip overlies the  stomach. Dilated small bowel loops seen suggesting ileus or obstruction. Blunting of the right breast phrenic angle suggesting small effusion. CT Results:  Results from [place] encounter on 01/20/22    CT ABD PELV WO CONT    Narrative  EXAM: CT of the abdomen and pelvis    CLINICAL INDICATION/HISTORY: epigastric pain and vomiting  > Additional: None. COMPARISON: CT abdomen pelvis with contrast 02/06/2017  > Reference Exam: None. TECHNIQUE: Axial CT imaging of the abdomen and pelvis was performed without  intravenous contrast. Oral contrast not administered. Multiplanar reformats  were generated. Dose reduction techniques used: automated exposure control,  adjustment of the mAs and/or kVp according to patient size, and iterative  reconstruction techniques. Digital Imaging and Communications in Medicine  (DICOM) format image data are available to nonaffiliated external healthcare  facilities or entities on a secure, media free, reciprocally searchable basis  with patient authorization for at least a 12-month period after this study.     _______________    FINDINGS:    LOWER CHEST: Coronary atherosclerosis. Lung bases are clear. LIVER, BILIARY: Liver is unremarkable. No biliary dilation. Dependent gallstone  without gallbladder distention or adjacent inflammatory changes. PANCREAS: Unremarkable. SPLEEN: Unremarkable. ADRENALS: Unremarkable. KIDNEYS/URETERS/BLADDER: No hydronephrosis. No calculi. Upper pole low density  lesion measuring 8 mm, very likely benign such as cyst. No follow-up imaging is  recommended as incidental lesions are likely benign. LYMPH NODES: No enlarged lymph nodes. GASTROINTESTINAL TRACT: Long segment of mild small bowel dilatation (up to 3.5  cm diameter) extending from the mid small bowel to the distal small bowel. There  is decompressed proximal and distal small bowel. Distal transition appears to be  within the anterior abdomen (axial image 86; sagittal 54), with no mass, likely  secondary to adhesions. No colonic dilatation. Colonic diverticulosis. PELVIC ORGANS: Hysterectomy. VASCULATURE: Mild to moderate aortoiliac atherosclerosis. BONES: No acute or aggressive osseous abnormalities identified. Moderate lower  lumbar degenerative disc disease. OTHER: No ascites. [place] fat containing umbilical hernia with neck measuring  20 mm.    _______________    Impression  1. Distal mild small bowel obstruction. Transition appears to be within the  anterior abdomen, likely from adhesions. Proximal small bowel also is  decompressed, raising the possibility of closed loop obstruction. 2. Cholelithiasis. 3. [place] fat containing umbilical hernia. MRI Results:  No results found for this or any previous visit. Nuclear Medicine Results:  No results found for this or any previous visit. US Results:  No results found for this or any previous visit. IR Results:  No results found for this or any previous visit. VAS/US Results:  No results found for this or any previous visit.               [address] [place] [address] CHERYL Walker

## 2022-01-24 NOTE — PROGRESS NOTES
In to see patient. Patient in bed watching tv. Assessment completed. No other needs noted at this time.  Will continue to monitor

## 2022-01-24 NOTE — PROGRESS NOTES
Received care of patient in bed alert watching TV no distress noted bed in lowest position no complaints voiced

## 2022-01-25 ENCOUNTER — APPOINTMENT (OUTPATIENT)
Dept: GENERAL RADIOLOGY | Age: 67
DRG: 389 | End: 2022-01-25
Attending: SURGERY
Payer: COMMERCIAL

## 2022-01-25 LAB
ANION GAP SERPL CALC-SCNC: 7 MMOL/L
BUN SERPL-MCNC: 3 MG/DL (ref 9–21)
BUN/CREAT SERPL: 4
CA-I BLD-MCNC: 9 MG/DL (ref 8.5–10.5)
CHLORIDE SERPL-SCNC: 102 MMOL/L (ref 94–111)
CO2 SERPL-SCNC: 26 MMOL/L (ref 21–33)
CREAT SERPL-MCNC: 0.8 MG/DL (ref 0.7–1.2)
ERYTHROCYTE [DISTWIDTH] IN BLOOD BY AUTOMATED COUNT: 14.2 % (ref 11.6–14.5)
GLUCOSE SERPL-MCNC: 104 MG/DL (ref 70–110)
HCT VFR BLD AUTO: 38.2 % (ref 35–45)
HGB BLD-MCNC: 12.5 G/DL (ref 12–16)
MAGNESIUM SERPL-MCNC: 1.8 MG/DL (ref 1.7–2.8)
MCH RBC QN AUTO: 25.6 PG (ref 24–34)
MCHC RBC AUTO-ENTMCNC: 32.7 G/DL (ref 31–37)
MCV RBC AUTO: 78.3 FL (ref 78–100)
NRBC # BLD: 0 K/UL (ref 0–0.01)
NRBC BLD-RTO: 0 PER 100 WBC
PLATELET # BLD AUTO: 193 K/UL (ref 135–420)
PMV BLD AUTO: 10.3 FL (ref 9.2–11.8)
POTASSIUM SERPL-SCNC: 4.3 MMOL/L (ref 3.2–5.1)
RBC # BLD AUTO: 4.88 M/UL (ref 4.2–5.3)
SODIUM SERPL-SCNC: 135 MMOL/L (ref 135–145)
WBC # BLD AUTO: 7.8 K/UL (ref 4.6–13.2)

## 2022-01-25 PROCEDURE — 74011250636 HC RX REV CODE- 250/636: Performed by: SURGERY

## 2022-01-25 PROCEDURE — 65270000029 HC RM PRIVATE

## 2022-01-25 PROCEDURE — 80048 BASIC METABOLIC PNL TOTAL CA: CPT

## 2022-01-25 PROCEDURE — 74011250636 HC RX REV CODE- 250/636: Performed by: PHYSICIAN ASSISTANT

## 2022-01-25 PROCEDURE — 83735 ASSAY OF MAGNESIUM: CPT

## 2022-01-25 PROCEDURE — 74011250637 HC RX REV CODE- 250/637: Performed by: SURGERY

## 2022-01-25 PROCEDURE — 36415 COLL VENOUS BLD VENIPUNCTURE: CPT

## 2022-01-25 PROCEDURE — 74019 RADEX ABDOMEN 2 VIEWS: CPT

## 2022-01-25 PROCEDURE — 85027 COMPLETE CBC AUTOMATED: CPT

## 2022-01-25 PROCEDURE — 74011000250 HC RX REV CODE- 250: Performed by: PHYSICIAN ASSISTANT

## 2022-01-25 RX ORDER — GUAIFENESIN 100 MG/5ML
81 LIQUID (ML) ORAL DAILY
Status: DISCONTINUED | OUTPATIENT
Start: 2022-01-25 | End: 2022-01-28

## 2022-01-25 RX ADMIN — FAMOTIDINE 20 MG: 10 INJECTION, SOLUTION INTRAVENOUS at 21:25

## 2022-01-25 RX ADMIN — POTASSIUM CHLORIDE, DEXTROSE MONOHYDRATE AND SODIUM CHLORIDE 125 ML/HR: 150; 5; 450 INJECTION, SOLUTION INTRAVENOUS at 03:14

## 2022-01-25 RX ADMIN — FAMOTIDINE 20 MG: 10 INJECTION, SOLUTION INTRAVENOUS at 09:36

## 2022-01-25 RX ADMIN — POTASSIUM CHLORIDE, DEXTROSE MONOHYDRATE AND SODIUM CHLORIDE 125 ML/HR: 150; 5; 450 INJECTION, SOLUTION INTRAVENOUS at 12:57

## 2022-01-25 RX ADMIN — ASPIRIN 81 MG: 81 TABLET, CHEWABLE ORAL at 09:36

## 2022-01-25 RX ADMIN — POTASSIUM CHLORIDE, DEXTROSE MONOHYDRATE AND SODIUM CHLORIDE 125 ML/HR: 150; 5; 450 INJECTION, SOLUTION INTRAVENOUS at 22:23

## 2022-01-25 NOTE — PROGRESS NOTES
1858-Assumed care of pt from off going nurse. 2100-Pt resting in bed on the phone, ice chips given per request, denies any other needs, call bell in reach. 2230-HS meds given, bedside commode emptied, no other needs, call bell in reach. 0200-Pt sleeping during rounds easy to arouse, no acute distress or sob, call bell in reach. 0500-Uneventful night, no acute distress or sob, pt received ice chips per request, no other needs, call bell in reach.

## 2022-01-25 NOTE — PROGRESS NOTES
11: 55 A. M.    NG out. No N/V/. Tolerating H2O and ice. No complaints of abdominal pain. Abd soft, not tender, not distended.     If continues as now, will resume diet in the A.M.

## 2022-01-25 NOTE — PROGRESS NOTES
Problem: Falls - Risk of  Goal: *Absence of Falls  Description: Document [place] Fall Risk and appropriate interventions in the flowsheet.   Outcome: Progressing Towards Goal  Note: Fall Risk Interventions:            Medication Interventions: Teach patient to arise slowly

## 2022-01-25 NOTE — PROGRESS NOTES
[address] care of patient    2000-Patient awake watching TV. Up to [facility]. Assessment completed. No needs voiced. 2143-scheduled pepcid given IV. No needs voiced. CBWR    0315 Patient up to [facility]. NGT reinforced. No needs voiced. CBWR.     0513-PCT in room. Patient denies any needs at this time.  CBWR

## 2022-01-25 NOTE — PROGRESS NOTES
0700- Assumed care of patient on walking rounds. 0935- administered AM medications with sips of water. Patient sitting up on the side of the bed.   CB in reach

## 2022-01-25 NOTE — PROGRESS NOTES
Subjective:  No complaints of abdominal pain, no nausea. Continues to pass flatus, several stools yesterday. Would like to have NG removed. Objective:  Afebrile, VSS. In no distress. Lungs clear. Abdomen remains soft, not distended, BS active, not tender, umbilical hernia unchanged. .    Abdominal xray today with several mildly distended air filled loops S.B. Asessment/plan:  Patient continues to improve clinically. Xray is lagging behind. Will D/C. NG, keep NPO except for ice chips and continue to follow. Repeat X ray in A. M.

## 2022-01-26 ENCOUNTER — APPOINTMENT (OUTPATIENT)
Dept: GENERAL RADIOLOGY | Age: 67
DRG: 389 | End: 2022-01-26
Attending: SURGERY
Payer: COMMERCIAL

## 2022-01-26 LAB
ANION GAP SERPL CALC-SCNC: 7 MMOL/L
BUN SERPL-MCNC: <1 MG/DL (ref 9–21)
BUN/CREAT SERPL: ABNORMAL
CA-I BLD-MCNC: 9.1 MG/DL (ref 8.5–10.5)
CHLORIDE SERPL-SCNC: 100 MMOL/L (ref 94–111)
CO2 SERPL-SCNC: 26 MMOL/L (ref 21–33)
CREAT SERPL-MCNC: 0.9 MG/DL (ref 0.7–1.2)
ERYTHROCYTE [DISTWIDTH] IN BLOOD BY AUTOMATED COUNT: 14.2 % (ref 11.6–14.5)
GLUCOSE SERPL-MCNC: 95 MG/DL (ref 70–110)
HCT VFR BLD AUTO: 39 % (ref 35–45)
HGB BLD-MCNC: 12.7 G/DL (ref 12–16)
MAGNESIUM SERPL-MCNC: 1.7 MG/DL (ref 1.7–2.8)
MCH RBC QN AUTO: 25.9 PG (ref 24–34)
MCHC RBC AUTO-ENTMCNC: 32.6 G/DL (ref 31–37)
MCV RBC AUTO: 79.4 FL (ref 78–100)
NRBC # BLD: 0 K/UL (ref 0–0.01)
NRBC BLD-RTO: 0 PER 100 WBC
PLATELET # BLD AUTO: 201 K/UL (ref 135–420)
PMV BLD AUTO: 10.5 FL (ref 9.2–11.8)
POTASSIUM SERPL-SCNC: 4.5 MMOL/L (ref 3.2–5.1)
RBC # BLD AUTO: 4.91 M/UL (ref 4.2–5.3)
SODIUM SERPL-SCNC: 133 MMOL/L (ref 135–145)
WBC # BLD AUTO: 6.7 K/UL (ref 4.6–13.2)

## 2022-01-26 PROCEDURE — 74011250636 HC RX REV CODE- 250/636: Performed by: PHYSICIAN ASSISTANT

## 2022-01-26 PROCEDURE — 80048 BASIC METABOLIC PNL TOTAL CA: CPT

## 2022-01-26 PROCEDURE — 74019 RADEX ABDOMEN 2 VIEWS: CPT

## 2022-01-26 PROCEDURE — 36415 COLL VENOUS BLD VENIPUNCTURE: CPT

## 2022-01-26 PROCEDURE — 85027 COMPLETE CBC AUTOMATED: CPT

## 2022-01-26 PROCEDURE — 83735 ASSAY OF MAGNESIUM: CPT

## 2022-01-26 PROCEDURE — 74011250636 HC RX REV CODE- 250/636: Performed by: SURGERY

## 2022-01-26 PROCEDURE — 74011000250 HC RX REV CODE- 250: Performed by: PHYSICIAN ASSISTANT

## 2022-01-26 PROCEDURE — 65270000029 HC RM PRIVATE

## 2022-01-26 PROCEDURE — 74011250637 HC RX REV CODE- 250/637: Performed by: SURGERY

## 2022-01-26 RX ADMIN — FAMOTIDINE 20 MG: 10 INJECTION, SOLUTION INTRAVENOUS at 08:32

## 2022-01-26 RX ADMIN — POTASSIUM CHLORIDE, DEXTROSE MONOHYDRATE AND SODIUM CHLORIDE 125 ML/HR: 150; 5; 450 INJECTION, SOLUTION INTRAVENOUS at 06:37

## 2022-01-26 RX ADMIN — FAMOTIDINE 20 MG: 10 INJECTION, SOLUTION INTRAVENOUS at 20:41

## 2022-01-26 RX ADMIN — ASPIRIN 81 MG: 81 TABLET, CHEWABLE ORAL at 08:32

## 2022-01-26 RX ADMIN — ONDANSETRON 4 MG: 2 INJECTION INTRAMUSCULAR; INTRAVENOUS at 18:30

## 2022-01-26 NOTE — PROGRESS NOTES
Comprehensive Nutrition Assessment    Type and Reason for Visit: reassessment    Nutrition Recommendations/Plan:  recommend clear liquids diet with ensure clear TID advance as tolerated to a cardiac diet and can stop ensure clear then. Nutrition Assessment:  74 yo female PMH: MI, HLD, HTN   Overweight BMI 28.9. Pt with hx of N/V x 5 days originally attributed to eating seafood on Sunday, but now comes in revealing has partial SBO 2/2 adhesions. per GI continue NPO with NGT for suction. Recommend once pt starts clear liquids diet provide ensure clear TID until diet can be advanced  K+ 3.0 on admission improved to 3.6 with IV K+    1/24/2022: NPO day 4 pt with 2 BM awaiting surgery recommendations on when to start diet. Surgery waiting for x ray results to make determination.      1/26/2022: Hospital day 6 starting clear liquids diet today will add ensure clear TID    Recent Results (from the past 24 hour(s))   CBC W/O DIFF    Collection Time: 01/26/22  5:13 AM   Result Value Ref Range    WBC 6.7 4.6 - 13.2 K/uL    RBC 4.91 4.20 - 5.30 M/uL    HGB 12.7 12.0 - 16.0 g/dL    HCT 39.0 35.0 - 45.0 %    MCV 79.4 78.0 - 100.0 FL    MCH 25.9 24.0 - 34.0 PG    MCHC 32.6 31.0 - 37.0 g/dL    RDW 14.2 11.6 - 14.5 %    PLATELET 613 959 - 364 K/uL    MPV 10.5 9.2 - 11.8 FL    NRBC 0.0 0.0  WBC    ABSOLUTE NRBC 0.00 0.00 - 5.07 K/uL   METABOLIC PANEL, BASIC    Collection Time: 01/26/22  5:13 AM   Result Value Ref Range    Sodium 133 (L) 135 - 145 mmol/L    Potassium 4.5 3.2 - 5.1 mmol/L    Chloride 100 94 - 111 mmol/L    CO2 26 21 - 33 mmol/L    Anion gap 7 mmol/L    Glucose 95 70 - 110 mg/dL    BUN <1 (L) 9 - 21 mg/dL    Creatinine 0.90 0.70 - 1.20 mg/dL    BUN/Creatinine ratio Not calculated      GFR est AA >60 ml/min/1.73m2    GFR est non-AA >60 ml/min/1.73m2    Calcium 9.1 8.5 - 10.5 mg/dL   MAGNESIUM    Collection Time: 01/26/22  5:13 AM   Result Value Ref Range    Magnesium 1.7 1.7 - 2.8 mg/dL       Malnutrition Assessment:  Malnutrition Status: Moderate malnutrition (NPO 5 days)    Context:  Acute illness     Findings of the 6 clinical characteristics of malnutrition:   Energy Intake:  7 - 50% or less of est energy requirements for 5 or more days  Weight Loss:  No significant weight loss     Body Fat Loss:  No significant body fat loss,     Muscle Mass Loss:  No significant muscle mass loss,    Fluid Accumulation:  No significant fluid accumulation,     Strength:  Normal  strength         Estimated Daily Nutrient Needs:  Energy (kcal): 7056-1985 kcal/day; Weight Used for Energy Requirements: Admission (72 kg)  Protein (g): 58 g/day; Weight Used for Protein Requirements: Admission (0.8-1 g/kg)  Fluid (ml/day): 1620-4253 mL/day; Method Used for Fluid Requirements: 1 ml/kcal      Nutrition Related Findings:  Overweight BMI 28.9. Pt with hx of N/V x 5 days originally attributed to eating seafood on Sunday, but now comes in revealing has partial SBO 2/2 adhesions. per GI continue NPO with NGT for suction. Wounds:    None       Current Nutrition Therapies:  ADULT DIET Clear Liquid    Anthropometric Measures:  · Height:  5' 2\" (157.5 cm)  · Current Body Wt:  71.7 kg (158 lb)   · Admission Body Wt:  158 lb    · Usual Body Wt:        · Ideal Body Wt:  110 lbs:  143.6 %   · Adjusted Body Weight:   ; Weight Adjustment for: No adjustment   · Adjusted BMI:       · BMI Category: Overweight (BMI 25.0-29. 9)       Nutrition Diagnosis:   · Predicted inadequate energy intake related to altered GI function,altered GI structure as evidenced by vomiting,nausea,NPO or clear liquid status due to medical condition      Nutrition Interventions:   Food and/or Nutrient Delivery: Continue NPO  Nutrition Education and Counseling: Education not appropriate  Coordination of Nutrition Care: Continue to monitor while inpatient    Goals:  K+ 3.5-5.1, pt to eat greater than 75% of meals once diet starts, BM every 1-3 days       Nutrition Monitoring and Evaluation:   Behavioral-Environmental Outcomes:    Food/Nutrient Intake Outcomes: Food and nutrient intake,Diet advancement/tolerance  Physical Signs/Symptoms Outcomes: Biochemical data,Meal time behavior,Weight     F/u: 1/28/2022    Discharge Planning:     Too soon to determine     Electronically signed by Malcolm Sam on 1/26/2022 at 3:21 PM    Contact: YAMIL [phone]

## 2022-01-26 NOTE — PROGRESS NOTES
0700- received care of patient resting in bed     0832- administered am medications with no complaints. 1100- IVF stopped.  Patient tolerating PO

## 2022-01-26 NOTE — PROGRESS NOTES
Patient up in bathroom having loose BM. Patient cleaned and linens changed patient denies any needs/discomfort at this time.

## 2022-01-26 NOTE — PROGRESS NOTES
Hospitalist Progress Note             Date of Service:  2022  NAME:  Keena Valladares  :  1955  MRN:  [id number]    Hospital course / Assessment    Principle Problems:  Small bowel obstruction ([facility]  - may due to adhesions  - NPO,  NG to suction,Pepcid,  passing gas, management per surgery    - KUB:  Persistent gas-filled dilated small bowel  - Had BM x2 , passing gas   Abnormal EKG / Bradycardia  -Rhythm: sinus bradycardia; and regular, Rate 48;  ST/T wave: T wave inverted; Other findings: QTc 438. Deeply inverted T waves in II; III; aVF. No ST elevation noted in I or aVL. .  - Avoid BB, CCB , no event on tele, Lytes normal , HR improved 80's, cardiology consulted, ASA rectal   Hypomagnesemia   - replace and cont' monitor lytes   Hyperlipidemia   - Statin on hold due to SBO  CAD  - ASA, Coreg and Lipitor on hold due to SBO      Active bs, tolerating diet, having flatus  Plan for dc in am      Hospital Problems  Date Reviewed: 2022          Codes Class Noted POA    Moderate protein-calorie malnutrition ([facility] ICD-10-CM: E44.0  ICD-9-CM: 263.0  2022 Unknown        * (Principal) Small bowel obstruction ([facility] ICD-10-CM: V94.099  ICD-9-CM: 560.9  2022 Unknown        Abnormal EKG ICD-10-CM: R94.31  ICD-9-CM: 794.31  2022 Unknown        Bradycardia ICD-10-CM: R00.1  ICD-9-CM: 427.89  2022 Unknown                Review of Systems:   A comprehensive review of systems was negative except for that written in the HPI. Vital Signs:    Last 24hrs VS reviewed since prior progress note.  Most recent are:  Visit Vitals  /70   Pulse 78   Temp 97.5 °F (36.4 °C)   Resp 16   Ht 5' 2\" (1.575 m)   Wt 74.4 kg (164 lb)   SpO2 94%   BMI 30.00 kg/m²         Intake/Output Summary (Last 24 hours) at 2022 1650  Last data filed at 2022 0514  Gross per 24 hour   Intake 1356.25 ml   Output --   Net 1356.25 ml        Physical Examination:             Physical Exam:   General Appearance:   Appears in no acute distress.,  HEENT:   Moist oral mucous membranes, conjunctiva clear, NGT   Neck:   Supple  Lungs: No wheezes. , No rales. , Normal respiratory effort,   Heart:   Regular rate and rhythm  Abdomen:   Soft , Non-distended and tender,   Extremities:   no edema of legs  Neuro:   alert, oriented, moves all extremities well       Data Review:    Review and/or order of clinical lab test  Review and/or order of tests in the radiology section of [facility]  Review and/or order of tests in the medicine section of [facility]      Labs:     Recent Labs     01/26/22 0513 01/25/22 0429   WBC 6.7 7.8   HGB 12.7 12.5   HCT 39.0 38.2    193     Recent Labs     01/26/22 0513 01/25/22 0429 01/24/22  0353   * 135 134*   K 4.5 4.3 4.2    102 103   CO2 26 26 25   BUN <1* 3* 3*   CREA 0.90 0.80 0.70   GLU 95 104 110   CA 9.1 9.0 9.1   MG 1.7 1.8 1.6*     No results for input(s): ALT, AP, TBIL, TBILI, TP, ALB, GLOB, GGT, AML, LPSE in the last 72 hours. No lab exists for component: SGOT, GPT, AMYP, HLPSE  No results for input(s): INR, PTP, APTT, INREXT in the last 72 hours. No results for input(s): FE, TIBC, PSAT, FERR in the last 72 hours. No results found for: FOL, RBCF   No results for input(s): PH, PCO2, PO2 in the last 72 hours. No results for input(s): CPK, CKNDX, TROIQ in the last 72 hours.     No lab exists for component: CPKMB  No results found for: CHOL, CHOLX, CHLST, CHOLV, HDL, HDLP, LDL, LDLC, DLDLP, TGLX, TRIGL, TRIGP, CHHD, CHHDX  No results found for: GLUCPOC  No results found for: COLOR, APPRN, SPGRU, REFSG, KAMARI, PROTU, GLUCU, KETU, BILU, UROU, DEANNE, LEUKU, GLUKE, EPSU, BACTU, WBCU, RBCU, CASTS, UCRY      Medications Reviewed:     Current Facility-Administered Medications   Medication Dose Route Frequency    aspirin chewable tablet 81 mg  81 mg Oral DAILY    benzocaine-menthoL (CEPACOL) lozenge 1 Lozenge  1 Lozenge Mucous Membrane PRN    acetaminophen (TYLENOL) tablet 650 mg  650 mg Oral Q4H PRN    ondansetron (ZOFRAN) injection 4 mg  4 mg IntraVENous Q4H PRN    dextrose 5% - 0.45% NaCl with KCl 20 mEq/L infusion  100 mL/hr IntraVENous CONTINUOUS    famotidine (PF) (PEPCID) 20 mg in 0.9% sodium chloride 10 mL injection  20 mg IntraVENous Q12H    LORazepam (ATIVAN) injection 1 mg  1 mg IntraVENous Q6H PRN     ______________________________________________________________________  EXPECTED LENGTH OF STAY: 2d 9h  ACTUAL LENGTH OF STAY:          6                 Christine Mota MD

## 2022-01-26 NOTE — PROGRESS NOTES
ALEXEI # 6. Partial S.B.O. Subjective:    NG removed yesterday, no nausea or vomiting while drinking water. Continues to pass flatus. No stool since yesterday. Denies abdominal pain. Objective:    Afebrile, VSS. Looks well. Abdomen is soft, not distended, not tender, B.S normoactive. Xray today:  Still has some air filled loops of S.B. No air fluid levels appreciated, more colon gas. Impression/plan:  Resolving S.B.O. Allow liquid diet. Increase activity.

## 2022-01-26 NOTE — PROGRESS NOTES
Patient heard from nursing station vomiting. Nurse to bedside. Patient vomited about 500 liquid vomit. Dr Zacarias Spear called orders received to cont fluids.  Npo except ice chips, repeat xray in AM.

## 2022-01-27 ENCOUNTER — APPOINTMENT (OUTPATIENT)
Dept: GENERAL RADIOLOGY | Age: 67
DRG: 389 | End: 2022-01-27
Attending: SURGERY
Payer: COMMERCIAL

## 2022-01-27 ENCOUNTER — ANESTHESIA EVENT (OUTPATIENT)
Dept: SURGERY | Age: 67
DRG: 389 | End: 2022-01-27
Payer: COMMERCIAL

## 2022-01-27 ENCOUNTER — APPOINTMENT (OUTPATIENT)
Dept: GENERAL RADIOLOGY | Age: 67
DRG: 389 | End: 2022-01-27
Attending: NURSE ANESTHETIST, CERTIFIED REGISTERED
Payer: COMMERCIAL

## 2022-01-27 ENCOUNTER — ANESTHESIA (OUTPATIENT)
Dept: SURGERY | Age: 67
DRG: 389 | End: 2022-01-27
Payer: COMMERCIAL

## 2022-01-27 LAB — MAGNESIUM SERPL-MCNC: 1.8 MG/DL (ref 1.7–2.8)

## 2022-01-27 PROCEDURE — 36415 COLL VENOUS BLD VENIPUNCTURE: CPT

## 2022-01-27 PROCEDURE — 83735 ASSAY OF MAGNESIUM: CPT

## 2022-01-27 PROCEDURE — 74011250636 HC RX REV CODE- 250/636: Performed by: PHYSICIAN ASSISTANT

## 2022-01-27 PROCEDURE — 74011250637 HC RX REV CODE- 250/637: Performed by: SURGERY

## 2022-01-27 PROCEDURE — C1751 CATH, INF, PER/CENT/MIDLINE: HCPCS

## 2022-01-27 PROCEDURE — 74011000250 HC RX REV CODE- 250: Performed by: PHYSICIAN ASSISTANT

## 2022-01-27 PROCEDURE — 65270000029 HC RM PRIVATE

## 2022-01-27 PROCEDURE — 02HV33Z INSERTION OF INFUSION DEVICE INTO SUPERIOR VENA CAVA, PERCUTANEOUS APPROACH: ICD-10-PCS | Performed by: INTERNAL MEDICINE

## 2022-01-27 PROCEDURE — 74018 RADEX ABDOMEN 1 VIEW: CPT

## 2022-01-27 PROCEDURE — 74011000250 HC RX REV CODE- 250: Performed by: INTERNAL MEDICINE

## 2022-01-27 PROCEDURE — 3E0436Z INTRODUCTION OF NUTRITIONAL SUBSTANCE INTO CENTRAL VEIN, PERCUTANEOUS APPROACH: ICD-10-PCS | Performed by: INTERNAL MEDICINE

## 2022-01-27 PROCEDURE — 74011250636 HC RX REV CODE- 250/636: Performed by: SURGERY

## 2022-01-27 PROCEDURE — 77001 FLUOROGUIDE FOR VEIN DEVICE: CPT

## 2022-01-27 RX ADMIN — FAMOTIDINE 20 MG: 10 INJECTION, SOLUTION INTRAVENOUS at 09:16

## 2022-01-27 RX ADMIN — I.V. FAT EMULSION 250 ML: 20 EMULSION INTRAVENOUS at 17:55

## 2022-01-27 RX ADMIN — ASPIRIN 81 MG: 81 TABLET, CHEWABLE ORAL at 09:16

## 2022-01-27 RX ADMIN — FAMOTIDINE 20 MG: 10 INJECTION, SOLUTION INTRAVENOUS at 21:54

## 2022-01-27 RX ADMIN — POTASSIUM CHLORIDE, DEXTROSE MONOHYDRATE AND SODIUM CHLORIDE 100 ML/HR: 150; 5; 450 INJECTION, SOLUTION INTRAVENOUS at 10:01

## 2022-01-27 RX ADMIN — ASCORBIC ACID, VITAMIN A PALMITATE, CHOLECALCIFEROL, THIAMINE HYDROCHLORIDE, RIBOFLAVIN-5 PHOSPHATE SODIUM, PYRIDOXINE HYDROCHLORIDE, NIACINAMIDE, DEXPANTHENOL, ALPHA-TOCOPHEROL ACETATE, VITAMIN K1, FOLIC ACID, BIOTIN, CYANOCOBALAMIN: 200; 3300; 200; 6; 3.6; 6; 40; 15; 10; 150; 600; 60; 5 INJECTION, SOLUTION INTRAVENOUS at 18:01

## 2022-01-27 RX ADMIN — POTASSIUM CHLORIDE, DEXTROSE MONOHYDRATE AND SODIUM CHLORIDE 100 ML/HR: 150; 5; 450 INJECTION, SOLUTION INTRAVENOUS at 00:36

## 2022-01-27 NOTE — PROGRESS NOTES
1900 - Assumed care of pt, shift report given    2042 - Assessment completed. Scheduled medication given. Electrodes replaced. BSC emptied. Denies pain at this time.  CBWR    0036 - New bag IVF hung by JAKI Tristan LPN    4283 - Pt appears to be asleep

## 2022-01-27 NOTE — PROGRESS NOTES
conducted an initial consultation and Spiritual Assessment for [facility], who is a 73 y.o.,female. The reason the Patient came to the hospital is:   Patient Active Problem List    Diagnosis Date Noted    Moderate protein-calorie malnutrition ([facility] 01/21/2022    Small bowel obstruction ([facility] 01/20/2022    Abnormal EKG 01/20/2022    Bradycardia 01/20/2022        The  provided the following Interventions:  Initiated a relationship of care and support. Explored issues of cecilia, spirituality and/or Pentecostalism needs while hospitalized. Listened empathically. Provided chaplaincy education. Provided information about Spiritual Care Services. Offered prayer and assurance of continued prayers on patient's behalf. Chart reviewed. The following outcomes were achieved:  Patient shared some information about their medical narrative and spiritual journey/beliefs. Patient processed feeling about current hospitalization. Patient expressed gratitude for the 's visit. Assessment:  Patient did not indicate any spiritual or Pentecostalism issues which require Spiritual Care Services interventions at this time. Patient does not have any Pentecostalism/cultural needs that will affect patients preferences in health care. Plan:  Chaplains will continue to follow and will provide pastoral care on an as needed or requested basis.  recommends bedside caregivers page  on duty if patient shows signs of acute spiritual or emotional distress. Per patient, feeling better but still experiencing some pain. Very dependent and thankful for the staff. No major concerns. Prayer of comfort provided.      [address] [facility]   Staff [address] [facility]   [phone]

## 2022-01-27 NOTE — ANESTHESIA PROCEDURE NOTES
PICC Line Placement    Start time: 1/27/2022 2:01 PM  End time: 1/27/2022 2:15 PM  Performed by: Tez Tracy CRNA  Authorized by: Tez Tracy CRNA     Indications: vascular access (History of Bowel Obstruction needs TPN)  Preanesthetic Checklist: patient identified, risks and benefits discussed, anesthesia consent, site marked, patient being monitored and timeout performed    Timeout Time: 14:01 EST       Pre-procedure: All elements of maximal sterile barrier technique followed? Yes    2% Chlorhexidine for cutaneous antisepsis, Hand hygiene performed prior to catheter insertion and Ultrasound guidance    Sterile Ultrasound Technique followed?: Yes            Procedure:   Prep:  Chlorhexidine    Orientation:  Left  Patient position:  Flat  Catheter type:  Double lumen  Catheter size: 15Fr Power Picc Solo. Caudal catheter size: 48cm. Number of attempts:  1  Successful placement: Yes      Assessment:   Post-procedure:  Catheter secured, sterile dressing applied and sterile dressing with CHG applied  Assessment:  Blood return through all ports, free fluid flow, placement verified by x-ray and guidewire removal verified  Insertion:  Uncomplicated  Patient tolerance:  Patient tolerated the procedure well with no immediate complications  PICC line insertion with 1 attempt. No complications. PICC is 48cm in length,  Secured at 5cm jose at skin line. PICC placement verified by Fluoroscopy. May use PICC immediately.

## 2022-01-27 NOTE — PROGRESS NOTES
ALEXEI # 7    Subjective:    Patient started on liquid diet yesterday however had approx 500 CC of emesis about 1800 last p.m. No further episodes, not nauseated and continues to pass flatus. Patient states she had multiple loose stools before the episode of emesis. Objective: On exam, comfortable. Abdomen is soft, B.S. normoactive, no high pitched sounds suggestive of obstruction. No distension, not tender. Small umbilical hernia completely reducible. X Ray today:  One air filled loop of small bowel, lots of colon gas. No air fluid levels. Impression:    ? Partial small bowel obstruction. Options are to proceed with Ex lap or investigate further. Difficult to make the case to go to the O.R. with a patient who appears clinically well. Will obtain Ba small bowel series. Unfortunately, I'm told this can not be done until tomorrow as no radilogist on site until then. Study ordered. Nutrition a factor now. Will insert PICC and discussed TPN with Hospitalist.    Plan:  Small bowel series, PICC line. NPO except ice and sips of water. Will Discuss with Dr. Jazmin Thomas as I will be out until Monday. Patient ware.

## 2022-01-27 NOTE — PROGRESS NOTES
Hospitalist Progress Note             Date of Service:  2022  NAME:  Chuck Arriaga  :  1955  MRN:  [id number]    Hospital course / Assessment    Principle Problems:  Small bowel obstruction ([facility]  - may due to adhesions  -She tolerated some diet yesterday but then vomited last night  - KUB:  Persistent gas-filled dilated small bowel  - Had BM x2 , passing gas   Abnormal EKG / Bradycardia  -Rhythm: sinus bradycardia; and regular, Rate 48;  ST/T wave: T wave inverted; Other findings: QTc 438. Deeply inverted T waves in II; III; aVF. No ST elevation noted in I or aVL. .  - Avoid BB, CCB , no event on tele, Lytes normal , HR improved 80's, cardiology consulted, ASA rectal   Hypomagnesemia   - replace and cont' monitor lytes   Hyperlipidemia   - Statin on hold due to SBO  CAD  - ASA, Coreg and Lipitor on hold due to SBO        Vomited 500 cc yesterday  She has a confusing clinical course and picture  She may have intermittent blockage  I have discussed her case with Dr. Joaquín Valero the general surgeon today we agree on a small bowel follow-through unfortunately this can only be done tomorrow  He has advised for PICC placement so that the patient can be started on TPN for nutrition until we can make the decision for surgery    Hospital Problems  Date Reviewed: 2022          Codes Class Noted POA    Moderate protein-calorie malnutrition ([facility] ICD-10-CM: E44.0  ICD-9-CM: 263.0  2022 Unknown        * (Principal) [facility] ICD-10-CM: Z50.875  ICD-9-CM: 560.9  2022 Unknown        Abnormal EKG ICD-10-CM: R94.31  ICD-9-CM: 794.31  2022 Unknown        Bradycardia ICD-10-CM: R00.1  ICD-9-CM: 427.89  2022 Unknown                Review of Systems:   A comprehensive review of systems was negative except for that written in the HPI.    Did great yesterday, eating, had bm, + flatus  Then last night, with 500cc vomit again      Vital Signs:    Last 24hrs VS reviewed since prior progress note. Most recent are:  Visit Vitals  /63   Pulse 68   Temp 97.4 °F (36.3 °C)   Resp 16   Ht 5' 2\" (1.575 m)   Wt 74.4 kg (164 lb)   SpO2 98%   BMI 30.00 kg/m²         Intake/Output Summary (Last 24 hours) at 1/27/2022 1124  Last data filed at 1/26/2022 1800  Gross per 24 hour   Intake 1200 ml   Output --   Net 1200 ml        Physical Examination:             Physical Exam:   General Appearance:   Appears in no acute distress.,  HEENT:   Moist oral mucous membranes, conjunctiva clear, NGT   Neck:   Supple  Lungs: No wheezes. , No rales. , Normal respiratory effort,   Heart:   Regular rate and rhythm  Abdomen:   Soft , Non-distended and tender, some bowel sounds  Extremities:   no edema of legs  Neuro:   alert, oriented, moves all extremities well       Data Review:    Review and/or order of clinical lab test  Review and/or order of tests in the radiology section of CPT  Review and/or order of tests in the medicine section of CPT      Labs:     Recent Labs     01/26/22  0513 01/25/22 0429   WBC 6.7 7.8   HGB 12.7 12.5   HCT 39.0 38.2    193     Recent Labs     01/27/22  0435 01/26/22  0513 01/25/22  0429   NA  --  133* 135   K  --  4.5 4.3   CL  --  100 102   CO2  --  26 26   BUN  --  <1* 3*   CREA  --  0.90 0.80   GLU  --  95 104   CA  --  9.1 9.0   MG 1.8 1.7 1.8     No results for input(s): ALT, AP, TBIL, TBILI, TP, ALB, GLOB, GGT, AML, LPSE in the last 72 hours. No lab exists for component: SGOT, GPT, AMYP, HLPSE  No results for input(s): INR, PTP, APTT, INREXT, INREXT in the last 72 hours. No results for input(s): FE, TIBC, PSAT, FERR in the last 72 hours. No results found for: FOL, RBCF   No results for input(s): PH, PCO2, PO2 in the last 72 hours. No results for input(s): CPK, CKNDX, TROIQ in the last 72 hours.     No lab exists for component: CPKMB  No results found for: CHOL, [facility], CHLST, CHOLV, HDL, HDLP, LDL, LDLC, DLDLP, TGLX, TRIGL, TRIGP, CHHD, CHHDX  No results found for: GLUCPOC  No results found for: COLOR, APPRN, SPGRU, REFSG, KAMARI, PROTU, GLUCU, KETU, BILU, UROU, DEANNE, LEUKU, GLUKE, EPSU, BACTU, WBCU, RBCU, CASTS, UCRY      Medications Reviewed:     Current Facility-Administered Medications   Medication Dose Route Frequency    aspirin chewable tablet 81 mg  81 mg Oral DAILY    benzocaine-menthoL (CEPACOL) lozenge 1 Lozenge  1 Lozenge Mucous Membrane PRN    acetaminophen (TYLENOL) tablet 650 mg  650 mg Oral Q4H PRN    ondansetron (ZOFRAN) injection 4 mg  4 mg IntraVENous Q4H PRN    dextrose 5% - 0.45% NaCl with KCl 20 mEq/L infusion  100 mL/hr IntraVENous CONTINUOUS    famotidine (PF) (PEPCID) 20 mg in 0.9% sodium chloride 10 mL injection  20 mg IntraVENous Q12H    LORazepam (ATIVAN) injection 1 mg  1 mg IntraVENous Q6H PRN     ______________________________________________________________________  EXPECTED LENGTH OF STAY: 2d 9h  ACTUAL LENGTH OF STAY:          7                 Brad Mendes MD

## 2022-01-27 NOTE — PROGRESS NOTES
Problem: Falls - Risk of  Goal: *Absence of Falls  Description: Document Darlen Ayden Fall Risk and appropriate interventions in the flowsheet.   Outcome: Progressing Towards Goal  Note: Fall Risk Interventions:            Medication Interventions: Teach patient to arise slowly                   Problem: Patient Education: Go to Patient Education Activity  Goal: Patient/Family Education  Outcome: Progressing Towards Goal     Problem: Nausea/Vomiting (Adult)  Goal: *Absence of nausea/vomiting  Outcome: Progressing Towards Goal  Goal: *Palliation of nausea/vomiting (Palliative Care)  Outcome: Progressing Towards Goal     Problem: Patient Education: Go to Patient Education Activity  Goal: Patient/Family Education  Outcome: Progressing Towards Goal     Problem: Pain  Goal: *Control of Pain  Outcome: Progressing Towards Goal     Problem: Patient Education: Go to Patient Education Activity  Goal: Patient/Family Education  Outcome: Progressing Towards Goal     Problem: Nutrition Deficit  Goal: *Optimize nutritional status  Outcome: Progressing Towards Goal

## 2022-01-27 NOTE — PROGRESS NOTES
0700- Assumed care of patient resting in bed. No distress noted. CB in reach     0900- patient resting in bed.  Denies any needs CB in reach

## 2022-01-27 NOTE — PROGRESS NOTES
Patient back to room from PACU. Patient is awake and alert. Denies any needs at this time.  CB in reach

## 2022-01-27 NOTE — PROGRESS NOTES
Comprehensive Nutrition Assessment    Type and Reason for Visit: reassessment    Nutrition Recommendations/Plan:   When PICC line placed, begin TPN  at 45 ml/hr Clinimix E standard solution with lipids 250 ml of 20% at 21 ml/hr for 12 hours. Nutrition Assessment:  74 yo female PMH: MI, HLD, HTN   Overweight BMI 28.9. Pt with hx of N/V x 5 days originally attributed to eating seafood on Sunday, but now comes in revealing has partial SBO 2/2 adhesions. per GI continue NPO with NGT for suction. Recommend once pt starts clear liquids diet provide ensure clear TID until diet can be advanced  K+ 3.0 on admission improved to 3.6 with IV K+    1/24/2022: NPO day 4 pt with 2 BM awaiting surgery recommendations on when to start diet. Surgery waiting for x ray results to make determination. 1/26/2022: Hospital day 6 starting clear liquids diet today will add ensure clear TID    1/27/2022 Hospital Day 7 with inadequate nutrition,  SBO no improvement nausea and vomiting returned. Provider would like to continue conservative treatment with TPN initiation until SBO resolves. Recent Results (from the past 24 hour(s))   MAGNESIUM    Collection Time: 01/27/22  4:35 AM   Result Value Ref Range    Magnesium 1.8 1.7 - 2.8 mg/dL       Malnutrition Assessment:  Malnutrition Status:   Moderate malnutrition (NPO 5 days)    Context:  Acute illness     Findings of the 6 clinical characteristics of malnutrition:   Energy Intake:  7 - 50% or less of est energy requirements for 5 or more days  Weight Loss:  No significant weight loss     Body Fat Loss:  No significant body fat loss,     Muscle Mass Loss:  No significant muscle mass loss,    Fluid Accumulation:  No significant fluid accumulation,     Strength:  Normal  strength         Estimated Daily Nutrient Needs:  Energy (kcal): 2743-8888 kcal/day; Weight Used for Energy Requirements: Admission (72 kg)  Protein (g): 58 g/day; Weight Used for Protein Requirements: Admission (0.8-1 g/kg)  Fluid (ml/day): 7183-6066 mL/day; Method Used for Fluid Requirements: 1 ml/kcal      Nutrition Related Findings:  Overweight BMI 28.9. Pt with hx of N/V x 5 days originally attributed to eating seafood on Sunday, but now comes in revealing has partial SBO 2/2 adhesions. per GI continue NPO with NGT for suction. Wounds:    None       Current Nutrition Therapies:  DIET NPO Ice Chips, Sips of Water with Meds    Anthropometric Measures:  · Height:  5' 2\" (157.5 cm)  · Current Body Wt:  71.7 kg (158 lb)   · Admission Body Wt:  158 lb    · Usual Body Wt:        · Ideal Body Wt:  110 lbs:  143.6 %   · Adjusted Body Weight:   ; Weight Adjustment for: No adjustment   · Adjusted BMI:       · BMI Category: Overweight (BMI 25.0-29. 9)       Nutrition Diagnosis:   · Predicted inadequate energy intake related to altered GI function,altered GI structure as evidenced by vomiting,nausea,NPO or clear liquid status due to medical condition      Nutrition Interventions:   Food and/or Nutrient Delivery: Continue NPO  TPN start at 42 ml/hr with lipids daily and advance to 83 ml/hr after 24 hours. Would continue IVF with NS and KCL at 50 ml/hr until goal of 83 ml/hr achieved and then drop IVF to 25 ml/hr. TPN at goal with lipids every other day would provide  2010  Calories and 100 gm protein. Recommend check Triglyceride, BMP, Magnesium and Phosphorus daily until TPN meeting nutrient needs. Nutrition Education and Counseling: Education not appropriate  Coordination of Nutrition Care: Continue to monitor while inpatient    Goals:  K+ 3.5-5.1, pt to eat greater than 75% of meals once diet starts, BM every 1-3 days    Improvement in K to 4.5. New goal - electrolytes WNL and tolerance of TPN at 83 ml/hr with lipids daily. Glucose . Advance diet as tolerated to goal  Cardiac diet with intake % meals.     Nutrition Monitoring and Evaluation:   Behavioral-Environmental Outcomes: Food/Nutrient Intake Outcomes: Food and nutrient intake,Diet advancement/tolerance  Physical Signs/Symptoms Outcomes: Biochemical data,Meal time behavior,Weight     F/u: 1/28/2022    Discharge Planning:     Too soon to determine     Electronically signed by Hoang Gaines on 1/27/2022 at 3:21 PM    Contact: YAMIL [phone]

## 2022-01-28 ENCOUNTER — APPOINTMENT (OUTPATIENT)
Dept: GENERAL RADIOLOGY | Age: 67
DRG: 389 | End: 2022-01-28
Attending: SURGERY
Payer: COMMERCIAL

## 2022-01-28 LAB
ANION GAP SERPL CALC-SCNC: 5 MMOL/L
BASOPHILS # BLD: 0 K/UL (ref 0–0.1)
BASOPHILS NFR BLD: 1 % (ref 0–2)
BUN SERPL-MCNC: 6 MG/DL (ref 9–21)
BUN/CREAT SERPL: 9
CA-I BLD-MCNC: 8.6 MG/DL (ref 8.5–10.5)
CHLORIDE SERPL-SCNC: 108 MMOL/L (ref 94–111)
CO2 SERPL-SCNC: 27 MMOL/L (ref 21–33)
CREAT SERPL-MCNC: 0.7 MG/DL (ref 0.7–1.2)
DIFFERENTIAL METHOD BLD: ABNORMAL
EOSINOPHIL # BLD: 0.1 K/UL (ref 0–0.4)
EOSINOPHIL NFR BLD: 2 % (ref 0–5)
ERYTHROCYTE [DISTWIDTH] IN BLOOD BY AUTOMATED COUNT: 14.1 % (ref 11.6–14.5)
GLUCOSE SERPL-MCNC: 117 MG/DL (ref 70–110)
HCT VFR BLD AUTO: 37.8 % (ref 35–45)
HGB BLD-MCNC: 12.2 G/DL (ref 12–16)
IMM GRANULOCYTES # BLD AUTO: 0 K/UL (ref 0–0.04)
IMM GRANULOCYTES NFR BLD AUTO: 0 % (ref 0–0.5)
LYMPHOCYTES # BLD: 2.2 K/UL (ref 0.9–3.6)
LYMPHOCYTES NFR BLD: 40 % (ref 21–52)
MAGNESIUM SERPL-MCNC: 1.8 MG/DL (ref 1.7–2.8)
MCH RBC QN AUTO: 25.6 PG (ref 24–34)
MCHC RBC AUTO-ENTMCNC: 32.3 G/DL (ref 31–37)
MCV RBC AUTO: 79.4 FL (ref 78–100)
MONOCYTES # BLD: 0.6 K/UL (ref 0.05–1.2)
MONOCYTES NFR BLD: 11 % (ref 3–10)
NEUTS SEG # BLD: 2.5 K/UL (ref 1.8–8)
NEUTS SEG NFR BLD: 46 % (ref 40–73)
NRBC # BLD: 0 K/UL (ref 0–0.01)
NRBC BLD-RTO: 0 PER 100 WBC
PHOSPHATE SERPL-MCNC: 4.2 MG/DL (ref 2.5–4.5)
PLATELET # BLD AUTO: 211 K/UL (ref 135–420)
PMV BLD AUTO: 10.2 FL (ref 9.2–11.8)
POTASSIUM SERPL-SCNC: 4.4 MMOL/L (ref 3.2–5.1)
RBC # BLD AUTO: 4.76 M/UL (ref 4.2–5.3)
SODIUM SERPL-SCNC: 140 MMOL/L (ref 135–145)
TRIGL SERPL-MCNC: 73 MG/DL (ref ?–150)
WBC # BLD AUTO: 5.5 K/UL (ref 4.6–13.2)

## 2022-01-28 PROCEDURE — 65270000029 HC RM PRIVATE

## 2022-01-28 PROCEDURE — 83735 ASSAY OF MAGNESIUM: CPT

## 2022-01-28 PROCEDURE — 80048 BASIC METABOLIC PNL TOTAL CA: CPT

## 2022-01-28 PROCEDURE — 84478 ASSAY OF TRIGLYCERIDES: CPT

## 2022-01-28 PROCEDURE — 74011000636 HC RX REV CODE- 636: Performed by: INTERNAL MEDICINE

## 2022-01-28 PROCEDURE — 84100 ASSAY OF PHOSPHORUS: CPT

## 2022-01-28 PROCEDURE — 74011250636 HC RX REV CODE- 250/636: Performed by: PHYSICIAN ASSISTANT

## 2022-01-28 PROCEDURE — 36415 COLL VENOUS BLD VENIPUNCTURE: CPT

## 2022-01-28 PROCEDURE — 74011000250 HC RX REV CODE- 250: Performed by: PHYSICIAN ASSISTANT

## 2022-01-28 PROCEDURE — 74011250636 HC RX REV CODE- 250/636: Performed by: SURGERY

## 2022-01-28 PROCEDURE — 74250 X-RAY XM SM INT 1CNTRST STD: CPT

## 2022-01-28 PROCEDURE — 85025 COMPLETE CBC W/AUTO DIFF WBC: CPT

## 2022-01-28 PROCEDURE — 74011000250 HC RX REV CODE- 250: Performed by: INTERNAL MEDICINE

## 2022-01-28 RX ADMIN — FAMOTIDINE 20 MG: 10 INJECTION, SOLUTION INTRAVENOUS at 21:17

## 2022-01-28 RX ADMIN — ASCORBIC ACID, VITAMIN A PALMITATE, CHOLECALCIFEROL, THIAMINE HYDROCHLORIDE, RIBOFLAVIN-5 PHOSPHATE SODIUM, PYRIDOXINE HYDROCHLORIDE, NIACINAMIDE, DEXPANTHENOL, ALPHA-TOCOPHEROL ACETATE, VITAMIN K1, FOLIC ACID, BIOTIN, CYANOCOBALAMIN: 200; 3300; 200; 6; 3.6; 6; 40; 15; 10; 150; 600; 60; 5 INJECTION, SOLUTION INTRAVENOUS at 15:46

## 2022-01-28 RX ADMIN — DIATRIZOATE MEGLUMINE AND DIATRIZOATE SODIUM 90 ML: 660; 100 LIQUID ORAL; RECTAL at 08:20

## 2022-01-28 RX ADMIN — POTASSIUM CHLORIDE, DEXTROSE MONOHYDRATE AND SODIUM CHLORIDE 100 ML/HR: 150; 5; 450 INJECTION, SOLUTION INTRAVENOUS at 00:37

## 2022-01-28 RX ADMIN — FAMOTIDINE 20 MG: 10 INJECTION, SOLUTION INTRAVENOUS at 10:19

## 2022-01-28 RX ADMIN — ONDANSETRON 4 MG: 2 INJECTION INTRAMUSCULAR; INTRAVENOUS at 15:46

## 2022-01-28 RX ADMIN — POTASSIUM CHLORIDE, DEXTROSE MONOHYDRATE AND SODIUM CHLORIDE 100 ML/HR: 150; 5; 450 INJECTION, SOLUTION INTRAVENOUS at 14:35

## 2022-01-28 RX ADMIN — ONDANSETRON 4 MG: 2 INJECTION INTRAMUSCULAR; INTRAVENOUS at 08:00

## 2022-01-28 NOTE — PROGRESS NOTES
Problem: Falls - Risk of  Goal: *Absence of Falls  Description: Document Gonzalo Harris Fall Risk and appropriate interventions in the flowsheet.   Outcome: Progressing Towards Goal  Note: Fall Risk Interventions:            Medication Interventions: Teach patient to arise slowly                   Problem: Patient Education: Go to Patient Education Activity  Goal: Patient/Family Education  Outcome: Progressing Towards Goal     Problem: Nausea/Vomiting (Adult)  Goal: *Absence of nausea/vomiting  Outcome: Progressing Towards Goal     Problem: Patient Education: Go to Patient Education Activity  Goal: Patient/Family Education  Outcome: Progressing Towards Goal     Problem: Pain  Goal: *Control of Pain  Outcome: Progressing Towards Goal     Problem: Patient Education: Go to Patient Education Activity  Goal: Patient/Family Education  Outcome: Progressing Towards Goal     Problem: Nutrition Deficit  Goal: *Optimize nutritional status  Outcome: Progressing Towards Goal

## 2022-01-28 NOTE — PROGRESS NOTES
Physician Progress Note      Grzegorz BETTENCOURT #:                  [id number]  :                       1955  ADMIT DATE:       2022 8:47 AM  DISCH DATE:  RESPONDING  PROVIDER #:        Lety Chairez MD          QUERY TEXT:    Dear Hospitalist,    Pt admitted with Partial SBO. Noted documentation of Moderate malnutrition in RD Consult note dated 22. If possible, please document in progress notes and discharge summary:    The medical record reflects the following:  Risk Factors: SBO, Nausea, Vomiting    Clinical Indicators: Per RD Malnutrition Assessment Dated :  *  Malnutrition Status: Moderate malnutrition (N/V x 5 days and now NPO for SBO admitted with K+ 3.0)  *  Context:  Acute illness  *  Findings of the 6 clinical characteristics of malnutrition:  -  Energy Intake:  7 - 50% or less of est energy requirements for 5 or more days (N/V x 5 days)  -  Weight Loss:  1 - 1% to 2% over 1 week  -  Body Fat Loss:  No significant body fat loss,  -  Muscle Mass Loss:  No significant muscle mass loss,  -  Fluid Accumulation:  No significant fluid accumulation,  -   Strength:  Normal  strength    Treatment: Per RD Nutrition Recommendations/Plan:  Once SBO resolved recommend clear liquids diet with ensure clear TID advance as tolerated to a cardiac diet and can stop ensure clear then. Thank you,  Angle Damon BSN, RN, CRCR  Please contact me for any questions or concerns regarding this query at [email or web address]  Options provided:  -- Moderate Malnutrition confirmed present on admission  -- Moderate malnutrition ruled out  -- Other - I will add my own diagnosis  -- Disagree - Not applicable / Not valid  -- Disagree - Clinically unable to determine / Unknown  -- Refer to Clinical Documentation Reviewer    PROVIDER RESPONSE TEXT:    The diagnosis of moderate malnutrition is confirmed as present on admission.     Query created by: Pema Messer on 2022 9:16 AM      Electronically signed by:  Bita Montilla MD 1/28/2022 3:21 PM

## 2022-01-28 NOTE — PROGRESS NOTES
Hospitalist Progress Note             Date of Service:  2022  NAME:  Jessy Byrnes  :  1955  MRN:  [id number]    Hospital course / Assessment    Principle Problems:  Small bowel obstruction ([facility] [address] [facility]  - may due to adhesions  -She tolerated some diet yesterday but then vomited last night  - KUB:  Persistent gas-filled dilated small bowel  - Had BM x2 , passing gas   Abnormal EKG / Bradycardia  -Rhythm: sinus bradycardia; and regular, Rate 48;  ST/T wave: T wave inverted; Other findings: QTc 438. Deeply inverted T waves in II; III; aVF. No ST elevation noted in I or aVL. .  - Avoid BB, CCB , no event on tele, Lytes normal , HR improved 80's, cardiology consulted, ASA rectal   Hypomagnesemia   - replace and cont' monitor lytes   Hyperlipidemia   - Statin on hold due to SBO  CAD  - ASA, Coreg and Lipitor on hold due to SBO           Discussed with dr Leonardo Powell reviewed sbft with same  Would not renew tpn for tomorrow  Start clears            Hospital Problems  Date Reviewed: 2022          Codes Class Noted POA    Moderate [facility]calorie malnutrition ([facility] ICD-10-CM: E44.0  ICD-9-CM: 263.0  2022 Unknown        * (Principal) Small bowel obstruction ([facility] ICD-10-CM: S65.362  ICD-9-CM: 560.9  2022 Unknown        Abnormal EKG ICD-10-CM: R94.31  ICD-9-CM: 794.31  2022 Unknown        Bradycardia ICD-10-CM: R00.1  ICD-9-CM: 427.89  2022 Unknown                Review of Systems:   A comprehensive review of systems was negative except for that written in the HPI. Vital Signs:    Last 24hrs VS reviewed since prior progress note.  Most recent are:  Visit Vitals  BP (!) 149/56 (BP 1 Location: Right upper arm, BP Patient Position: At rest)   Pulse 85   Temp 98.7 °F (37.1 °C)   Resp 20   Ht 5' 2\" (1.575 m)   Wt 74.6 kg (164 lb 8 oz)   SpO2 99%   BMI 30.09 kg/m²         Intake/Output Summary (Last 24 hours) at 1/28/2022 1047  Last data filed at 1/28/2022 0605  Gross per 24 hour   Intake 5730.02 ml   Output --   Net 5730.02 ml        Physical Examination:             Physical Exam:   General Appearance:   Appears in no acute distress.,  HEENT:   Moist oral mucous membranes, conjunctiva clear, NGT   Neck:   Supple  Lungs:    No wheezes. , No rales. , Normal respiratory effort,   Heart:   Regular rate and rhythm  Abdomen:   Soft , Non-distended and tender, some bowel sounds  Extremities:   no edema of legs  Neuro:   alert, oriented, moves all extremities well       Data Review:    Review and/or order of clinical lab test  Review and/or order of tests in the radiology section of CPT  Review and/or order of tests in the medicine section of CPT      Labs:     Recent Labs     01/28/22 0330 01/26/22  0513   WBC 5.5 6.7   HGB 12.2 12.7   HCT 37.8 39.0    201     Recent Labs     01/28/22  0330 01/27/22  0435 01/26/22  0513     --  133*   K 4.4  --  4.5     --  100   CO2 27  --  26   BUN 6*  --  <1*   CREA 0.70  --  0.90   *  --  95   CA 8.6  --  9.1   MG 1.8 1.8 1.7   PHOS 4.2  --   --      No results for input(s): ALT, AP, TBIL, TBILI, TP, ALB, GLOB, GGT, AML, LPSE in the last 72 hours. No lab exists for component: SGOT, GPT, AMYP, HLPSE  No results for input(s): INR, PTP, APTT, INREXT in the last 72 hours. No results for input(s): FE, TIBC, PSAT, FERR in the last 72 hours. No results found for: FOL, RBCF   No results for input(s): PH, PCO2, PO2 in the last 72 hours. No results for input(s): CPK, CKNDX, TROIQ in the last 72 hours.     No lab exists for component: CPKMB  No results found for: CHOL, CHOLX, CHLST, CHOLV, HDL, HDLP, LDL, LDLC, DLDLP, TGLX, TRIGL, TRIGP, CHHD, CHHDX  No results found for: GLUCPOC  No results found for: COLOR, APPRN, SPGRU, REFSG, KAMARI, PROTU, GLUCU, KETU, BILU, UROU, DEANNE, LEUKU, GLUKE, EPSU, BACTU, WBCU, RBCU, CASTS, UCRY      Medications Reviewed:     Current Facility-Administered Medications   Medication Dose Route Frequency    TPN ADULT - CENTRAL AA 5% D20% W/ CA + ELECTROLYTES   IntraVENous CONTINUOUS    benzocaine-menthoL (CEPACOL) lozenge 1 Lozenge  1 Lozenge Mucous Membrane PRN    acetaminophen (TYLENOL) tablet 650 mg  650 mg Oral Q4H PRN    ondansetron (ZOFRAN) injection 4 mg  4 mg IntraVENous Q4H PRN    dextrose 5% - 0.45% NaCl with KCl 20 mEq/L infusion  100 mL/hr IntraVENous CONTINUOUS    famotidine (PF) (PEPCID) 20 mg in 0.9% sodium chloride 10 mL injection  20 mg IntraVENous Q12H    LORazepam (ATIVAN) injection 1 mg  1 mg IntraVENous Q6H PRN     ______________________________________________________________________  EXPECTED LENGTH OF STAY: 2d 9h  ACTUAL LENGTH OF STAY:          8                 Edmund Wright MD

## 2022-01-28 NOTE — PROGRESS NOTES
Pt in bed resting. Pt will be having GI series this AM; pt is requesting Zofran. Lung sounds positive. Bowel sounds positive; hypoactive. PP positive. Double lumen PICC line to the LUE w/ IVF and TPN infusing. Pt denies pain or discomfort at this time.

## 2022-01-28 NOTE — PROGRESS NOTES
Progress Note  Date:2022       Room:[facility] [place]  Patient Michael Tracey     YOB: 1955     Age:66 y.o. Subjective    Subjective no abd complaints, reported loose stool  Review of Systems   All other systems reviewed and are negative. Objective         Vitals Last 24 Hours:  TEMPERATURE:  Temp  Av.7 °F (36.5 °C)  Min: 96.9 °F (36.1 °C)  Max: 98.7 °F (37.1 °C)  RESPIRATIONS RANGE: Resp  Av.7  Min: 16  Max: 20  PULSE OXIMETRY RANGE: SpO2  Av.8 %  Min: 90 %  Max: 100 %  PULSE RANGE: Pulse  Av.6  Min: 62  Max: 86  BLOOD PRESSURE RANGE: Systolic (33AND), VVR:908 , Min:91 , YUD:558   ; Diastolic (75YZY), TJB:92, Min:56, Max:92    I/O (24Hr): Intake/Output Summary (Last 24 hours) at 2022 0945  Last data filed at 2022 4698  Gross per 24 hour   Intake 5730.02 ml   Output --   Net 5730.02 ml     Objective  Labs/Imaging/Diagnostics    Labs:  CBC:  Recent Labs     22  0330 22  0513   WBC 5.5 6.7   RBC 4.76 4.91   HGB 12.2 12.7   HCT 37.8 39.0   MCV 79.4 79.4   RDW 14.1 14.2    201     CHEMISTRIES:  Recent Labs     22  0330 22  0435 22  0513     --  133*   K 4.4  --  4.5     --  100   CO2 27  --  26   BUN 6*  --  <1*   CA 8.6  --  9.1   PHOS 4.2  --   --    MG 1.8 1.8 1.7   PT/INR:No results for input(s): INR, INREXT in the last 72 hours. No lab exists for component: PROTIME  APTT:No results for input(s): APTT in the last 72 hours. LIVER PROFILE:No results for input(s): AST, ALT in the last 72 hours. No lab exists for component: Delia Naina, ALKPHOS  Lab Results   Component Value Date/Time    ALT (SGPT) 9 2022 03:30 AM    AST (SGOT) 13 (L) 2022 03:30 AM    Alk.  phosphatase 100 2022 03:30 AM    Bilirubin, direct 0.1 2022 08:55 AM    Bilirubin, total 0.4 2022 03:30 AM       Imaging Last 24 Hours:  XR PLACEMENT CVAD FLUORO GUIDED    Result Date: 1/27/2022  Fluoroscopy was used during PICC line insertion for this procedure under the supervision of the attending Quinten DOMÍNGUEZ Start Time:     1355 hours End Time:      1410 hours Fluoro Time: hr min   0.19 sec # of Images:  1 DOSE: 2.6528 mGy       Administrative report. BP     Assessment//Plan   Principal Problem:    Small bowel obstruction ([address] (1/20/2022)    Active Problems:    Abnormal EKG (1/20/2022)      Bradycardia (1/20/2022)      Moderate protein-calorie malnutrition (HCC) (1/21/2022)      Assessment & Plan66 yo female hd 8 for intermittent n/v and no evidence sbo by history or abd series. SBFT pending for definitive eval of possible partial intermittent sbo. Will keep npo until sbft complete and f/u films in am.Continue to maximize hydration, nutrition, electrolyte balance. I have discussed strict I/O criteria and accurate recording in chart with charge nurse.     Electronically signed by Sher Esparza MD on 1/28/2022 at 9:45 AM

## 2022-01-28 NOTE — PROGRESS NOTES
Comprehensive Nutrition Assessment    Type and Reason for Visit: reassessment    Nutrition Recommendations/Plan:   Increase TPN  to 83 ml/hr Clinimix E standard solution without lipids today (lipids every other day). Reduce IVF to 25 ml/hr and continue for hydration and K needs. Check magnesium, phosphorus and BMP in am.     Nutrition Assessment:  74 yo female PMH: MI, HLD, HTN   Overweight BMI 28.9. Pt with hx of N/V x 5 days originally attributed to eating seafood on Sunday, but now comes in revealing has partial SBO 2/2 adhesions. per GI continue NPO with NGT for suction. Recommend once pt starts clear liquids diet provide ensure clear TID until diet can be advanced  K+ 3.0 on admission improved to 3.6 with IV K+  1/24/2022: NPO day 4 pt with 2 BM awaiting surgery recommendations on when to start diet. Surgery waiting for x ray results to make determination. 1/26/2022: Hospital day 6 starting clear liquids diet today will add ensure clear TID  1/27/2022 Hospital Day 7 with inadequate nutrition,  SBO no improvement nausea and vomiting returned. Provider would like to continue conservative treatment with TPN initiation until SBO resolves. 1/28/2022- + BS -hypoactive per nursing, zofran given for nausea this am.  TPN tolerated. Glucose elevated 117- but meeting goal of . No additions to current TPN, but increase rate to 83 ml/hr goal. Triglyceride lab pending, but recommend holding lipids today to maintain energy balance.     Recent Results (from the past 24 hour(s))   CBC WITH AUTOMATED DIFF    Collection Time: 01/28/22  3:30 AM   Result Value Ref Range    WBC 5.5 4.6 - 13.2 K/uL    RBC 4.76 4.20 - 5.30 M/uL    HGB 12.2 12.0 - 16.0 g/dL    HCT 37.8 35.0 - 45.0 %    MCV 79.4 78.0 - 100.0 FL    MCH 25.6 24.0 - 34.0 PG    MCHC 32.3 31.0 - 37.0 g/dL    RDW 14.1 11.6 - 14.5 %    PLATELET 053 305 - 648 K/uL    MPV 10.2 9.2 - 11.8 FL    NRBC 0.0 0.0  WBC    ABSOLUTE NRBC 0.00 0.00 - 0.01 K/uL NEUTROPHILS 46 40 - 73 %    LYMPHOCYTES 40 21 - 52 %    MONOCYTES 11 (H) 3 - 10 %    EOSINOPHILS 2 0 - 5 %    BASOPHILS 1 0 - 2 %    IMMATURE GRANULOCYTES 0 0 - 0.5 %    ABS. NEUTROPHILS 2.5 1.8 - 8.0 K/UL    ABS. LYMPHOCYTES 2.2 0.9 - 3.6 K/UL    ABS. MONOCYTES 0.6 0.05 - 1.2 K/UL    ABS. EOSINOPHILS 0.1 0.0 - 0.4 K/UL    ABS. BASOPHILS 0.0 0.0 - 0.1 K/UL    ABS. IMM. GRANS. 0.0 0.00 - 0.04 K/UL    DF AUTOMATED     MAGNESIUM    Collection Time: 01/28/22  3:30 AM   Result Value Ref Range    Magnesium 1.8 1.7 - 2.8 mg/dL   METABOLIC PANEL, BASIC    Collection Time: 01/28/22  3:30 AM   Result Value Ref Range    Sodium 140 135 - 145 mmol/L    Potassium 4.4 3.2 - 5.1 mmol/L    Chloride 108 94 - 111 mmol/L    CO2 27 21 - 33 mmol/L    Anion gap 5 mmol/L    Glucose 117 (H) 70 - 110 mg/dL    BUN 6 (L) 9 - 21 mg/dL    Creatinine 0.70 0.70 - 1.20 mg/dL    BUN/Creatinine ratio 9      GFR est AA >60 ml/min/1.73m2    GFR est non-AA >60 ml/min/1.73m2    Calcium 8.6 8.5 - 10.5 mg/dL   PHOSPHORUS    Collection Time: 01/28/22  3:30 AM   Result Value Ref Range    Phosphorus 4.2 2.5 - 4.5 mg/dL       Malnutrition Assessment:  Malnutrition Status:   Moderate malnutrition (NPO 5 days)    Context:  Acute illness     Findings of the 6 clinical characteristics of malnutrition:   Energy Intake:  7 - 50% or less of est energy requirements for 5 or more days  Weight Loss:  No significant weight loss     Body Fat Loss:  No significant body fat loss,     Muscle Mass Loss:  No significant muscle mass loss,    Fluid Accumulation:  No significant fluid accumulation,     Strength:  Normal  strength         Estimated Daily Nutrient Needs:  Energy (kcal): 4713-8714 kcal/day; Weight Used for Energy Requirements: Admission (72 kg)  Protein (g): 75-97; Weight Used for Protein Requirements: Current (1.0-1.3 gm/kg) for repletion  Fluid (ml/day): 0314-0884 mL/day; Method Used for Fluid Requirements: 1 ml/kcal      Nutrition Related Findings: Overweight BMI 28.9. Pt with hx of N/V x 5 days originally attributed to eating seafood on Sunday, but now comes in revealing has partial SBO 2/2 adhesions. per GI continue NPO with NGT for suction. Wounds:    None       Current Nutrition Therapies:  DIET NPO Ice Chips, Sips of Water with Meds  TPN ADULT - CENTRAL AA 5% D20% W/ CA + ELECTROLYTES 42 ml/hr    Anthropometric Measures:  · Height:  5' 2\" (157.5 cm)  · Current Body Wt:  71.7 kg (158 lb)   · Admission Body Wt:  158 lb    · Usual Body Wt:        · Ideal Body Wt:  110 lbs:  143.6 %   · Adjusted Body Weight:   ; Weight Adjustment for: No adjustment   · Adjusted BMI:       · BMI Category: Overweight (BMI 25.0-29. 9)       Nutrition Diagnosis:   · Predicted inadequate energy intake related to altered GI function,altered GI structure as evidenced by vomiting,nausea,NPO or clear liquid status due to medical condition      Nutrition Interventions:   Food and/or Nutrient Delivery: Continue NPO  TPN start at 42 ml/hr with lipids daily and advance to 83 ml/hr after 24 hours. Would continue IVF with NS and KCL at 50 ml/hr until goal of 83 ml/hr achieved and then drop IVF to 25 ml/hr. TPN at goal with lipids every other day would provide  2010  Calories and 100 gm protein. Recommend check Triglyceride, BMP, Magnesium and Phosphorus daily until TPN meeting nutrient needs. Nutrition Education and Counseling: Education not appropriate  Coordination of Nutrition Care: Continue to monitor while inpatient    Goals:  K+ 3.5-5.1, pt to eat greater than 75% of meals once diet starts, BM every 1-3 days    Improvement in K to 4.5. New goal - electrolytes WNL and tolerance of TPN at 83 ml/hr with lipids daily. Glucose . Advance diet as tolerated to goal  Cardiac diet with intake % meals.     Nutrition Monitoring and Evaluation:   Behavioral-Environmental Outcomes:    Food/Nutrient Intake Outcomes: Food and nutrient intake,Diet advancement/tolerance  Physical Signs/Symptoms Outcomes: Biochemical data,Meal time behavior,Weight     F/u: 1/29/2022    Discharge Planning:     Too soon to determine     Electronically signed by Yari Hallman on 1/28/2022 at 3:21 PM    Contact: YAMIL [phone]

## 2022-01-28 NOTE — PROGRESS NOTES
1900 - Assumed care of pt, shift report given    2158 - Scheduled medication given. Assessment completed. IVF/TPN/Lipids infusing without issue to PICC in JEMAL. VSS. Pt denies any pain, discomfort or needs at this time. CBWR    0034 - VSS. Pt resting in bed, no needs voiced at this time. PIV in Right wrist removed per protocol.     0602 - Lipids stopped at 12 hour jose. BSC emptied. Pt resting in bed, denies any needs at this time.  CBWR

## 2022-01-29 ENCOUNTER — APPOINTMENT (OUTPATIENT)
Dept: GENERAL RADIOLOGY | Age: 67
DRG: 389 | End: 2022-01-29
Attending: SURGERY
Payer: COMMERCIAL

## 2022-01-29 LAB
ANION GAP SERPL CALC-SCNC: 7 MMOL/L
BUN SERPL-MCNC: 13 MG/DL (ref 9–21)
BUN/CREAT SERPL: 22
CA-I BLD-MCNC: 8.9 MG/DL (ref 8.5–10.5)
CHLORIDE SERPL-SCNC: 106 MMOL/L (ref 94–111)
CO2 SERPL-SCNC: 23 MMOL/L (ref 21–33)
CREAT SERPL-MCNC: 0.6 MG/DL (ref 0.7–1.2)
ERYTHROCYTE [DISTWIDTH] IN BLOOD BY AUTOMATED COUNT: 14.2 % (ref 11.6–14.5)
GLUCOSE SERPL-MCNC: 125 MG/DL (ref 70–110)
HCT VFR BLD AUTO: 38.6 % (ref 35–45)
HGB BLD-MCNC: 12.3 G/DL (ref 12–16)
MAGNESIUM SERPL-MCNC: 1.8 MG/DL (ref 1.7–2.8)
MCH RBC QN AUTO: 25.3 PG (ref 24–34)
MCHC RBC AUTO-ENTMCNC: 31.9 G/DL (ref 31–37)
MCV RBC AUTO: 79.3 FL (ref 78–100)
NRBC # BLD: 0 K/UL (ref 0–0.01)
NRBC BLD-RTO: 0 PER 100 WBC
PLATELET # BLD AUTO: 218 K/UL (ref 135–420)
PMV BLD AUTO: 10.3 FL (ref 9.2–11.8)
POTASSIUM SERPL-SCNC: 4.5 MMOL/L (ref 3.2–5.1)
RBC # BLD AUTO: 4.87 M/UL (ref 4.2–5.3)
SODIUM SERPL-SCNC: 136 MMOL/L (ref 135–145)
WBC # BLD AUTO: 6.5 K/UL (ref 4.6–13.2)

## 2022-01-29 PROCEDURE — 65270000029 HC RM PRIVATE

## 2022-01-29 PROCEDURE — 83735 ASSAY OF MAGNESIUM: CPT

## 2022-01-29 PROCEDURE — 74011000250 HC RX REV CODE- 250: Performed by: PHYSICIAN ASSISTANT

## 2022-01-29 PROCEDURE — 85027 COMPLETE CBC AUTOMATED: CPT

## 2022-01-29 PROCEDURE — 74011250636 HC RX REV CODE- 250/636: Performed by: PHYSICIAN ASSISTANT

## 2022-01-29 PROCEDURE — 80048 BASIC METABOLIC PNL TOTAL CA: CPT

## 2022-01-29 PROCEDURE — 36415 COLL VENOUS BLD VENIPUNCTURE: CPT

## 2022-01-29 PROCEDURE — 74011250636 HC RX REV CODE- 250/636: Performed by: SURGERY

## 2022-01-29 PROCEDURE — 74022 RADEX COMPL AQT ABD SERIES: CPT

## 2022-01-29 RX ADMIN — POTASSIUM CHLORIDE, DEXTROSE MONOHYDRATE AND SODIUM CHLORIDE 100 ML/HR: 150; 5; 450 INJECTION, SOLUTION INTRAVENOUS at 21:57

## 2022-01-29 RX ADMIN — POTASSIUM CHLORIDE, DEXTROSE MONOHYDRATE AND SODIUM CHLORIDE 100 ML/HR: 150; 5; 450 INJECTION, SOLUTION INTRAVENOUS at 12:11

## 2022-01-29 RX ADMIN — FAMOTIDINE 20 MG: 10 INJECTION, SOLUTION INTRAVENOUS at 10:57

## 2022-01-29 RX ADMIN — FAMOTIDINE 20 MG: 10 INJECTION, SOLUTION INTRAVENOUS at 21:57

## 2022-01-29 NOTE — PROGRESS NOTES
Comprehensive Nutrition Assessment    Type and Reason for Visit: reassessment    Nutrition Recommendations/Plan:  TPN continues until current bag complete. Clear liquid with Ensure Clear at lunch and supper. If tolerated advance to regular, low saturated fat, 2 gm Na, and continue Ensure Clear until intake greater than 75% of meals and tolerated     Nutrition Assessment:  74 yo female PMH: MI, HLD, HTN   Overweight BMI 28.9. Pt with hx of N/V x 5 days originally attributed to eating seafood on Sunday, but now comes in revealing has partial SBO 2/2 adhesions. per GI continue NPO with NGT for suction. Recommend once pt starts clear liquids diet provide ensure clear TID until diet can be advanced  K+ 3.0 on admission improved to 3.6 with IV K+  1/24/2022: NPO day 4 pt with 2 BM awaiting surgery recommendations on when to start diet. Surgery waiting for x ray results to make determination. 1/26/2022: Hospital day 6 starting clear liquids diet today will add ensure clear TID  1/27/2022 Hospital Day 7 with inadequate nutrition,  SBO no improvement nausea and vomiting returned. Provider would like to continue conservative treatment with TPN initiation until SBO resolves. 1/28/2022- + BS -hypoactive per nursing, zofran given for nausea this am.  TPN tolerated. Glucose elevated 117- but meeting goal of . No additions to current TPN, but increase rate to 83 ml/hr goal. Triglyceride lab pending, but recommend holding lipids today to maintain energy balance. 1/29/2022  sbft complete and pt able to advance to clear liquid. Nursing notes pt tolerating clear liquid and pudding. TPN continues until current bag complete. Electrolytes,magnesium remain WNL. Glucose elevated at 125- expect improvement after TPN discontinued.      Recent Results (from the past 24 hour(s))   MAGNESIUM    Collection Time: 01/29/22  5:00 AM   Result Value Ref Range    Magnesium 1.8 1.7 - 2.8 mg/dL   METABOLIC PANEL, BASIC Collection Time: 01/29/22  5:00 AM   Result Value Ref Range    Sodium 136 135 - 145 mmol/L    Potassium 4.5 3.2 - 5.1 mmol/L    Chloride 106 94 - 111 mmol/L    CO2 23 21 - 33 mmol/L    Anion gap 7 mmol/L    Glucose 125 (H) 70 - 110 mg/dL    BUN 13 9 - 21 mg/dL    Creatinine 0.60 (L) 0.70 - 1.20 mg/dL    BUN/Creatinine ratio 22      GFR est AA >60 ml/min/1.73m2    GFR est non-AA >60 ml/min/1.73m2    Calcium 8.9 8.5 - 10.5 mg/dL   CBC W/O DIFF    Collection Time: 01/29/22  5:00 AM   Result Value Ref Range    WBC 6.5 4.6 - 13.2 K/uL    RBC 4.87 4.20 - 5.30 M/uL    HGB 12.3 12.0 - 16.0 g/dL    HCT 38.6 35.0 - 45.0 %    MCV 79.3 78.0 - 100.0 FL    MCH 25.3 24.0 - 34.0 PG    MCHC 31.9 31.0 - 37.0 g/dL    RDW 14.2 11.6 - 14.5 %    PLATELET 378 074 - 219 K/uL    MPV 10.3 9.2 - 11.8 FL    NRBC 0.0 0.0  WBC    ABSOLUTE NRBC 0.00 0.00 - 0.01 K/uL       Malnutrition Assessment:  Malnutrition Status: Moderate malnutrition (NPO 5 days)    Context:  Acute illness     Findings of the 6 clinical characteristics of malnutrition:   Energy Intake:  7 - 50% or less of est energy requirements for 5 or more days  Weight Loss:  No significant weight loss     Body Fat Loss:  No significant body fat loss,     Muscle Mass Loss:  No significant muscle mass loss,    Fluid Accumulation:  No significant fluid accumulation,     Strength:  Normal  strength         Estimated Daily Nutrient Needs:  Energy (kcal): 0920-2800 kcal/day; Weight Used for Energy Requirements: Admission (72 kg)  Protein (g): 75-97; Weight Used for Protein Requirements: Current (1.0-1.3 gm/kg) for repletion  Fluid (ml/day): 4034-6805 mL/day; Method Used for Fluid Requirements: 1 ml/kcal      Nutrition Related Findings:  Overweight BMI 28.9. Pt with hx of N/V x 5 days originally attributed to eating seafood on Sunday, but now comes in revealing has partial SBO 2/2 adhesions. per GI continue NPO with NGT for suction.       Wounds:    None       Current Nutrition Therapies:  ADULT DIET Clear Liquid  TPN ADULT - CENTRAL AA 5% D20% W/ CA + ELECTROLYTES  ADULT ORAL NUTRITION SUPPLEMENT Lunch, Dinner; Clear Liquid 42 ml/hr    Anthropometric Measures:  · Height:  5' 2\" (157.5 cm)  · Current Body Wt:  71.7 kg (158 lb)   · Admission Body Wt:  158 lb    · Usual Body Wt:        · Ideal Body Wt:  110 lbs:  143.6 %   · Adjusted Body Weight:   ; Weight Adjustment for: No adjustment   · Adjusted BMI:       · BMI Category: Overweight (BMI 25.0-29. 9)       Nutrition Diagnosis:   · Predicted inadequate energy intake related to altered GI function,altered GI structure as evidenced by vomiting,nausea,NPO or clear liquid status due to medical condition - improvement noted with tolerance of clear liquid diet. Nutrition Interventions:   Food and/or Nutrient Delivery:  Complete TPN of 83 ml/hr and discontinue after current bag. .    Clear liquid with Ensure Enlive  BID (Supplement provides 360 calories and 18 gm protein if 100% consumed) Advance to regular with low saturated fat and 2 gm Na diet. Would continue supplement until intake greater than 75% of solid food. Nutrition Education and Counseling: assess nutritional education need once diet advanced. Coordination of Nutrition Care: Continue to monitor while inpatient    Goals:  K+ 3.5-5.1, pt to eat greater than 75% of meals once diet starts, BM every 1-3 days    Improvement in K to 4.5. New goal - electrolytes WNL and tolerance of TPN at 83 ml/hr with lipids daily. Glucose . Advance diet as tolerated to goal  Cardiac diet with intake % meals. Improvement towards goal- now tolerating clear liquid. Nutrition Monitoring and Evaluation:   Behavioral-Environmental Outcomes:    Food/Nutrient Intake Outcomes: Food and nutrient intake,Diet advancement/tolerance  Physical Signs/Symptoms Outcomes: Biochemical data,Meal time behavior,Weight     F/u: 1/31/2022    Discharge Planning:     Too soon to determine Electronically signed by Vera Abdul RD on 1/29/2022 at 09:36 AM    Contact: YAMIL [phone]

## 2022-01-29 NOTE — PROGRESS NOTES
1900-Assumed care of pt from off going nurse. 2300-HS med received, assisted to bedside commode, no other needs voiced, call bell in reach. 0500-Uneventful night, pt resting in bed, no acute distress or sob. Call bell in reach.

## 2022-01-29 NOTE — PROGRESS NOTES
Received pt awake and alert in bed. Pt is oriented x4. Lung sounds clear. Bowel sounds positive and hyperactive. PP positive. Pt has double lumen PICC line to the left arm w/ IVF of D5 1/2 NS w/ 20 meq of Potassium at 100 ml/hr to purple lumen and TPN at 83 ml/hr to the red port. Pt denies pain or discomfort. Pt is tolerating clear liquids well; does not like the chicken broth; substituted for jello and vanilla pudding and is tolerating that well. Pt currently denies pain or discomfort.

## 2022-01-29 NOTE — PROGRESS NOTES
Hospitalist Progress Note     INTERNAL MEDICINE PROGRESS NOTE  Patient: Keena Valladares   YOB: 1955   MRN: [id number]      Hospital course / Assessment    Principle Problems:  Small bowel obstruction ([address]  - may due to adhesions  - She tolerated some diet yesterday but then vomited last night  - KUB:  Persistent gas-filled dilated small bowel,   - SBFT : Contrast material from the recent small bowel series has progressed into the colon with no high-grade obstruction identified. Mildly distended gas-filled small bowel loops identified centrally in the abdomen which is nonspecific. Abnormal EKG / Bradycardia  -sinus bradycardia; and regular, Rate 48;  ST/T wave: T wave inverted; Other findings: QTc 438. Deeply inverted T waves in II; III; aVF.  No ST elevation noted in I or aVL. .  - Avoid BB, CCB , no event on tele, Lytes normal , HR improved 80's, cardiology consulted, ASA rectal   Hypomagnesemia   - replaced and cont' monitor lytes   Hyperlipidemia   - Statin on hold due to SBO  CAD  - ASA, Coreg and Lipitor on hold due to SBO    Code Status: Full code   DVT prophylaxis: pharmacologic and mechanical  Today Recommendation and Plan:   Resume clear liquid diet and monitor   Management per surgery   Ambulate  Avoid Narcotic   Monitor Lytes   May home in AM       Disposition    Disposition: TBD     Subjective / ROS:   Patient states she is doing better, but still having N/V with food       Medical Decision Making   Chart, Images and Lab data reviewed, necessary medical Orders placed   Discussed with nursing staff     Vitals:    22 0759 22 1358 22 2000 22 0000   BP: (!) 149/56 133/71 (!) 169/96 (!) 93/56   Pulse: 85  (!) 59 84   Resp: 20 18 20 20   Temp: 98.7 °F (37.1 °C) 97.9 °F (36.6 °C) 98.3 °F (36.8 °C) 97.3 °F (36.3 °C)   SpO2: 99% 96% 98% 99%   Weight:       Height:         Temp (24hrs), Av.8 °F (36.6 °C), Min:97.3 °F (36.3 °C), Max:98.3 °F (36.8 °C)      Intake/Output Summary (Last 24 hours) at 1/29/2022 0819  Last data filed at 1/29/2022 0518  Gross per 24 hour   Intake 1123.27 ml   Output 1080 ml   Net 43.27 ml       Physical Exam:   General Appearance:   Appears in no acute distress.,  HEENT:   Moist oral mucous membranes, conjunctiva clear,   Neck:   Supple  Lungs: No wheezes. , No rales. , Normal respiratory effort,   Heart:   Regular rate and rhythm  Abdomen:   Soft , Non-distended and Non-tender,   Extremities:   no edema of legs  Neuro:   alert, oriented, moves all extremities well    Current medications:     Current Facility-Administered Medications   Medication Dose Route Frequency    TPN ADULT - CENTRAL AA 5% D20% W/ CA + ELECTROLYTES   IntraVENous CONTINUOUS    benzocaine-menthoL (CEPACOL) lozenge 1 Lozenge  1 Lozenge Mucous Membrane PRN    acetaminophen (TYLENOL) tablet 650 mg  650 mg Oral Q4H PRN    ondansetron (ZOFRAN) injection 4 mg  4 mg IntraVENous Q4H PRN    dextrose 5% - 0.45% NaCl with KCl 20 mEq/L infusion  100 mL/hr IntraVENous CONTINUOUS    famotidine (PF) (PEPCID) 20 mg in 0.9% sodium chloride 10 mL injection  20 mg IntraVENous Q12H    LORazepam (ATIVAN) injection 1 mg  1 mg IntraVENous Q6H PRN          Laboratory and Radiology Data :      No results found for: FERR  WBC   Date Value Ref Range Status   01/29/2022 6.5 4.6 - 13.2 K/uL Final     No results found for: MARK  No results found for: UEO    Microbiology    No results found for: GMS    Recent Results (from the past 24 hour(s))   METABOLIC PANEL, BASIC    Collection Time: 01/29/22  5:00 AM   Result Value Ref Range    Sodium 136 135 - 145 mmol/L    Potassium 4.5 3.2 - 5.1 mmol/L    Chloride 106 94 - 111 mmol/L    CO2 23 21 - 33 mmol/L    Anion gap 7 mmol/L    Glucose PENDING mg/dL    BUN PENDING mg/dL    Creatinine PENDING mg/dL    BUN/Creatinine ratio PENDING     GFR est AA PENDING ml/min/1.73m2    GFR est non-AA PENDING ml/min/1.73m2    Calcium 8.9 8.5 - 10.5 mg/dL CBC W/O DIFF    Collection Time: 01/29/22  5:00 AM   Result Value Ref Range    WBC 6.5 4.6 - 13.2 K/uL    RBC 4.87 4.20 - 5.30 M/uL    HGB 12.3 12.0 - 16.0 g/dL    HCT 38.6 35.0 - 45.0 %    MCV 79.3 78.0 - 100.0 FL    MCH 25.3 24.0 - 34.0 PG    MCHC 31.9 31.0 - 37.0 g/dL    RDW 14.2 11.6 - 14.5 %    PLATELET 899 888 - 704 K/uL    MPV 10.3 9.2 - 11.8 FL    NRBC 0.0 0.0  WBC    ABSOLUTE NRBC 0.00 0.00 - 0.01 K/uL       XR Results:  Results from [place] encounter on 01/20/22    XR PLACEMENT CVAD FLUORO GUIDED    Narrative  Fluoroscopy was used during PICC line insertion for this procedure under the  supervision of the attending Dee DOMÍNGUEZ  Start Time:     1355 hours  End Time:      1410 hours  Fluoro Time: hr min   0.19 sec  # of Images:  1  DOSE: 2.6528 mGy    Impression  Administrative report. BP      CT Results:  Results from Hospital Encounter encounter on 01/20/22    CT ABD PELV WO CONT    Narrative  EXAM: CT of the abdomen and pelvis    CLINICAL INDICATION/HISTORY: epigastric pain and vomiting  > Additional: None. COMPARISON: CT abdomen pelvis with contrast 02/06/2017  > Reference Exam: None. TECHNIQUE: Axial CT imaging of the abdomen and pelvis was performed without  intravenous contrast. Oral contrast not administered. Multiplanar reformats  were generated. Dose reduction techniques used: automated exposure control,  adjustment of the mAs and/or kVp according to patient size, and iterative  reconstruction techniques. Digital Imaging and Communications in Medicine  (DICOM) format image data are available to nonaffiliated external healthcare  facilities or entities on a secure, media free, reciprocally searchable basis  with patient authorization for at least a 12-month period after this study. _______________    FINDINGS:    LOWER CHEST: Coronary atherosclerosis. Lung bases are clear. LIVER, BILIARY: Liver is unremarkable. No biliary dilation.  Dependent gallstone  without gallbladder distention or adjacent inflammatory changes. PANCREAS: Unremarkable. SPLEEN: Unremarkable. ADRENALS: Unremarkable. KIDNEYS/URETERS/BLADDER: No hydronephrosis. No calculi. Upper pole low density  lesion measuring 8 mm, very likely benign such as cyst. No follow-up imaging is  recommended as incidental lesions are likely benign. LYMPH NODES: No enlarged lymph nodes. GASTROINTESTINAL TRACT: Long segment of mild small bowel dilatation (up to 3.5  cm diameter) extending from the mid small bowel to the distal small bowel. There  is decompressed proximal and distal small bowel. Distal transition appears to be  within the anterior abdomen (axial image 86; sagittal 54), with no mass, likely  secondary to adhesions. No colonic dilatation. Colonic diverticulosis. PELVIC ORGANS: Hysterectomy. VASCULATURE: Mild to moderate aortoiliac atherosclerosis. BONES: No acute or aggressive osseous abnormalities identified. Moderate lower  lumbar degenerative disc disease. OTHER: No ascites. [place] fat containing umbilical hernia with neck measuring  20 mm.    _______________    Impression  1. Distal mild small bowel obstruction. Transition appears to be within the  anterior abdomen, likely from adhesions. Proximal small bowel also is  decompressed, raising the possibility of closed loop obstruction. 2. Cholelithiasis. 3. [place] fat containing umbilical hernia. MRI Results:  No results found for this or any previous visit. Nuclear Medicine Results:  No results found for this or any previous visit. US Results:  No results found for this or any previous visit. IR Results:  No results found for this or any previous visit. VAS/US Results:  No results found for this or any previous visit. Rafael Valles M.D.   Hospitalist

## 2022-01-30 VITALS
DIASTOLIC BLOOD PRESSURE: 72 MMHG | BODY MASS INDEX: 30.27 KG/M2 | HEIGHT: 62 IN | RESPIRATION RATE: 18 BRPM | TEMPERATURE: 97.8 F | OXYGEN SATURATION: 98 % | SYSTOLIC BLOOD PRESSURE: 126 MMHG | HEART RATE: 69 BPM | WEIGHT: 164.5 LBS

## 2022-01-30 LAB
ANION GAP SERPL CALC-SCNC: 6 MMOL/L
BUN SERPL-MCNC: 8 MG/DL (ref 9–21)
BUN/CREAT SERPL: 11
CA-I BLD-MCNC: 8.9 MG/DL (ref 8.5–10.5)
CHLORIDE SERPL-SCNC: 104 MMOL/L (ref 94–111)
CO2 SERPL-SCNC: 24 MMOL/L (ref 21–33)
CREAT SERPL-MCNC: 0.7 MG/DL (ref 0.7–1.2)
ERYTHROCYTE [DISTWIDTH] IN BLOOD BY AUTOMATED COUNT: 14.3 % (ref 11.6–14.5)
GLUCOSE SERPL-MCNC: 88 MG/DL (ref 70–110)
HCT VFR BLD AUTO: 37.2 % (ref 35–45)
HGB BLD-MCNC: 12 G/DL (ref 12–16)
MAGNESIUM SERPL-MCNC: 1.7 MG/DL (ref 1.7–2.8)
MCH RBC QN AUTO: 26 PG (ref 24–34)
MCHC RBC AUTO-ENTMCNC: 32.3 G/DL (ref 31–37)
MCV RBC AUTO: 80.7 FL (ref 78–100)
NRBC # BLD: 0 K/UL (ref 0–0.01)
NRBC BLD-RTO: 0 PER 100 WBC
PLATELET # BLD AUTO: 191 K/UL (ref 135–420)
PMV BLD AUTO: 10 FL (ref 9.2–11.8)
POTASSIUM SERPL-SCNC: 4.4 MMOL/L (ref 3.2–5.1)
RBC # BLD AUTO: 4.61 M/UL (ref 4.2–5.3)
SODIUM SERPL-SCNC: 134 MMOL/L (ref 135–145)
WBC # BLD AUTO: 7.4 K/UL (ref 4.6–13.2)

## 2022-01-30 PROCEDURE — 36415 COLL VENOUS BLD VENIPUNCTURE: CPT

## 2022-01-30 PROCEDURE — 74011000250 HC RX REV CODE- 250: Performed by: PHYSICIAN ASSISTANT

## 2022-01-30 PROCEDURE — 80048 BASIC METABOLIC PNL TOTAL CA: CPT

## 2022-01-30 PROCEDURE — 85027 COMPLETE CBC AUTOMATED: CPT

## 2022-01-30 PROCEDURE — 74011250636 HC RX REV CODE- 250/636: Performed by: PHYSICIAN ASSISTANT

## 2022-01-30 PROCEDURE — 83735 ASSAY OF MAGNESIUM: CPT

## 2022-01-30 RX ORDER — ONDANSETRON 4 MG/1
4 TABLET, ORALLY DISINTEGRATING ORAL
Qty: 15 TABLET | Refills: 0 | Status: SHIPPED | OUTPATIENT
Start: 2022-01-30 | End: 2022-02-04

## 2022-01-30 RX ADMIN — FAMOTIDINE 20 MG: 10 INJECTION, SOLUTION INTRAVENOUS at 08:54

## 2022-01-30 NOTE — PROGRESS NOTES
Progress Note  Date:2022       Room:[facility]  Patient Ny Domingo     YOB: 1955     Age:66 y.o. Subjective    Subjective tolerating diet with good gastrointestinal urinary function. No abdominal complaints. Review of Systems   All other systems reviewed and are negative. Objective         Vitals Last 24 Hours:  TEMPERATURE:  Temp  Av.9 °F (36.6 °C)  Min: 97.3 °F (36.3 °C)  Max: 98.8 °F (37.1 °C)  RESPIRATIONS RANGE: Resp  Av  Min: 17  Max: 19  PULSE OXIMETRY RANGE: SpO2  Av.4 %  Min: 96 %  Max: 99 %  PULSE RANGE: Pulse  Av  Min: 68  Max: 86  BLOOD PRESSURE RANGE: Systolic (15DSC), YOV:072 , Min:114 , LHJ:546   ; Diastolic (52CHY), ZYR:48, Min:49, Max:69    I/O (24Hr): Intake/Output Summary (Last 24 hours) at 2022 6912  Last data filed at 2022 0549  Gross per 24 hour   Intake 500 ml   Output --   Net 500 ml     Objective:  General Appearance:  Comfortable, well-appearing and not in pain. Vital signs: (most recent): Blood pressure 122/69, pulse 71, temperature 98.8 °F (37.1 °C), resp. rate 18, height 5' 2\" (1.575 m), weight 74.6 kg (164 lb 8 oz), SpO2 99 %. Vital signs are normal.    Output: Producing urine and producing stool. HEENT: Normal HEENT exam.    Lungs:  Normal effort. Heart: Normal rate. Abdomen: Abdomen is soft. Labs/Imaging/Diagnostics    Labs:  CBC:  Recent Labs     22  0250 22  0500 22  0330   WBC 7.4 6.5 5.5   RBC 4.61 4.87 4.76   HGB 12.0 12.3 12.2   HCT 37.2 38.6 37.8   MCV 80.7 79.3 79.4   RDW 14.3 14.2 14.1    218 211     CHEMISTRIES:  Recent Labs     22  0250 22  0500 22  0330   * 136 140   K 4.4 4.5 4.4    106 108   CO2 24 23 27   BUN 8* 13 6*   CA 8.9 8.9 8.6   PHOS  --   --  4.2   MG 1.7 1.8 1.8   PT/INR:No results for input(s): INR, INREXT in the last 72 hours.     No lab exists for component: PROTIME  APTT:No results for input(s): APTT in the last 72 hours. LIVER PROFILE:No results for input(s): AST, ALT in the last 72 hours. No lab exists for component: IDA Rendon  Lab Results   Component Value Date/Time    ALT (SGPT) 9 01/22/2022 03:30 AM    AST (SGOT) 13 (L) 01/22/2022 03:30 AM    Alk. phosphatase 100 01/22/2022 03:30 AM    Bilirubin, direct 0.1 01/20/2022 08:55 AM    Bilirubin, total 0.4 01/22/2022 03:30 AM       Imaging Last 24 Hours:  No results found. Assessment//Plan   Principal Problem:    Small bowel obstruction ([address] (1/20/2022)    Active Problems:    Abnormal EKG (1/20/2022)      Bradycardia (1/20/2022)      Moderate protein-calorie malnutrition ([address] (1/21/2022)      Assessment & Plan 60-year-old female without evidence of surgical pathology tolerating her diet good gastrointestinal and urinary function. Would advise discharge to home. No need for surgical follow-up at this time given the normal small bowel follow-through. I discussed with Dr. Casey Catherine.     Electronically signed by Ish Wise MD on 1/30/2022 at 9:27 AM

## 2022-01-30 NOTE — PROGRESS NOTES
[address] January 30, 2022       RE: Tania Mayfield      To Whom It May Concern,    This is to certify that Tania Mayfield may return to work on Thursday 02/03/2022    Please feel free to contact my office if you have any questions or concerns. Thank you for your assistance in this matter.       Sincerely,  Marlin Powell

## 2022-01-30 NOTE — PROGRESS NOTES
Patient discharged home with son. All belongings with patient. PICC removed by Alek Benoit RN. Patient verbalized understanding of instructions.

## 2022-01-30 NOTE — PROGRESS NOTES
Problem: Falls - Risk of  Goal: *Absence of Falls  Description: Document Jose Daniel Counts Fall Risk and appropriate interventions in the flowsheet.   Outcome: Progressing Towards Goal  Note: Fall Risk Interventions:            Medication Interventions: Teach patient to arise slowly                   Problem: Patient Education: Go to Patient Education Activity  Goal: Patient/Family Education  Outcome: Progressing Towards Goal     Problem: Nausea/Vomiting (Adult)  Goal: *Absence of nausea/vomiting  Outcome: Progressing Towards Goal  Goal: *Palliation of nausea/vomiting (Palliative Care)  Outcome: Progressing Towards Goal     Problem: Patient Education: Go to Patient Education Activity  Goal: Patient/Family Education  Outcome: Progressing Towards Goal

## 2022-01-30 NOTE — PROGRESS NOTES
0700- assumed care of patient. 0902- administered AM medications with no complaints. patient up washing up in bathroom denies any needs at this time.

## 2024-02-09 NOTE — PROGRESS NOTES
Pt had an uneventful night, she had several loose small bowel movements. No distress noted. No complaints voiced.  Water refilled, [facility] DISCHARGE

## (undated) DEVICE — POWERPICC SOLO2 CATHETER WITH SHERLOCK TLS TECHNOLOGY 5F MAXIMAL BARRIER TRAY WITH MICROINTRODUCER (DUAL-LUMEN): Brand: POWERPICC SOLO2 CATHETER WITH SHERLOCK TIP LOCATION SYSTEM (TLS) STYLET